# Patient Record
Sex: FEMALE | Race: WHITE | NOT HISPANIC OR LATINO | Employment: FULL TIME | URBAN - METROPOLITAN AREA
[De-identification: names, ages, dates, MRNs, and addresses within clinical notes are randomized per-mention and may not be internally consistent; named-entity substitution may affect disease eponyms.]

---

## 2017-01-09 ENCOUNTER — APPOINTMENT (OUTPATIENT)
Dept: MEDICAL GROUP | Age: 22
End: 2017-01-09
Payer: COMMERCIAL

## 2017-01-13 ENCOUNTER — NON-PROVIDER VISIT (OUTPATIENT)
Dept: MEDICAL GROUP | Age: 22
End: 2017-01-13
Payer: COMMERCIAL

## 2017-01-13 DIAGNOSIS — Z23 NEED FOR VACCINATION: ICD-10-CM

## 2017-01-13 PROCEDURE — 90471 IMMUNIZATION ADMIN: CPT | Performed by: PHYSICIAN ASSISTANT

## 2017-01-13 PROCEDURE — 90621 MENB-FHBP VACC 2/3 DOSE IM: CPT | Performed by: PHYSICIAN ASSISTANT

## 2017-01-13 NOTE — MR AVS SNAPSHOT
Danyelle Oliveros Ana Arevalo   2017 1:00 PM   Non-Provider Visit   MRN: [id number]    Department:  [address] [place] [address] [facility]   Dept Phone:  [phone]    Description:  Female : 1995   Provider:  OSIEL SOSA MA           Allergies as of 2017     Allergen Noted Reactions    Latex 10/03/2014       When apply will cause rash      You were diagnosed with     Need for vaccination   [[id number]]         Vital Signs     Smoking Status                   Never Smoker            Basic Information     Date Of Birth Sex Race Ethnicity Preferred Language    1995 Female White Non- English      Your appointments     May 31, 2017 10:00 AM   Non Provider 1 with [facility] SHEILA CASTRO   [facility])    [address]  [place] [address] [phone]           You will be receiving a confirmation call a few days before your appointment from our automated call confirmation system.              Problem List              ICD-10-CM Priority Class Noted - Resolved    Tension headache G44.209   2014 - Present    DUB (dysfunctional uterine bleeding) N93.8   2015 - Present    H/O iron deficiency secondary to DUB Z86.39   2015 - Present    Obesity (BMI 35.0-39.9 without comorbidity) ([facility]) E66.9   2016 - Present      Health Maintenance        Date Due Completion Dates    IMM HEP B VACCINE (1 of 3 - Primary Series) 1997 ---    IMM HEP A VACCINE (1 of 2 - Standard Series) 1998 ---    IMM HPV VACCINE (1 of 3 - Female 3 Dose Series) 2008 ---    IMM VARICELLA (CHICKENPOX) VACCINE (1 of 2 - 2 Dose Adolescent Series) 2010 ---    IMM MENINGOCOCCAL VACCINE (MCV4) (1 of 1) 2013 ---    IMM DTaP/Tdap/Td Vaccine (2 - Td) 2025            Current Immunizations     Influenza TIV (IM) 2016    Influenza Vaccine Quad Inj (Pf) 2014    Meningococcal Vaccine Serogroup B (Trumenba) 2017, 2016 11:57 AM    Tdap Vaccine 2015      Below and/or attached are the medications your provider expects you to take. Review all of your home medications and newly ordered medications with your provider and/or pharmacist. Follow medication instructions as directed by your provider and/or pharmacist. Please keep your medication list with you and share with your provider. Update the information when medications are discontinued, doses are changed, or new medications (including over-the-counter products) are added; and carry medication information at all times in the event of emergency situations     Allergies:  LATEX - (reactions not documented)               Medications  Valid as of: January 13, 2017 -  1:32 PM    Generic Name Brand Name Tablet Size Instructions for use    Butalbital-APAP-Caffeine (Tab) FIORICET -40 MG Take 1 Tab by mouth every 6 hours as needed. FOR HEADACHE.        Norethin Ace-Eth Estrad-FE (Tab) MICROGESTIN FE1.5/30 1.5-30 MG-MCG Take 1 Tab by mouth every day.        Ondansetron HCl (Tab) ZOFRAN 4 MG Take 1 Tab by mouth every four hours as needed for Nausea/Vomiting.        .                 Medicines prescribed today were sent to:     [facility] #[id number] - JEREMIAH ZURITA - [address]    [address] [place] [address]    Phone: [phone] Fax: [phone]    Open 24 Hours?: No      Medication refill instructions:       If your prescription bottle indicates you have medication refills left, it is not necessary to call your provider’s office. Please contact your pharmacy and they will refill your medication.    If your prescription bottle indicates you do not have any refills left, you may request refills at any time through one of the following ways: The online [employer] system (except Urgent Care), by calling your provider’s office, or by asking your pharmacy to contact your provider’s office with a refill request. Medication refills are processed only during regular business hours and may not be available until the next  business day. Your provider may request additional information or to have a follow-up visit with you prior to refilling your medication.   *Please Note: Medication refills are assigned a new Rx number when refilled electronically. Your pharmacy may indicate that no refills were authorized even though a new prescription for the same medication is available at the pharmacy. Please request the medicine by name with the pharmacy before contacting your provider for a refill.           VisualXcripthart Access Code: Activation code not generated  Current NICO Status: Active

## 2017-01-13 NOTE — PROGRESS NOTES
"Danyelle Arevalo is a 21 y.o. female here for a non-provider visit for:   TRUMENBA (Men B) 1 of 2    Reason for immunization: continue or complete series started at the office  Immunization records indicate need for vaccine: Yes  Minimum interval has been met for this vaccine: Yes  ABN completed: Not Indicated    VIS Dated  01/13/2017 was given to patient Yes  All IAC Questionnaire questions were answered “No.\"    Patient tolerated injection and no adverse effects were observed or reported patient tolerated.    Pt scheduled for next dose in series: Yes        "

## 2017-01-16 ENCOUNTER — TELEPHONE (OUTPATIENT)
Dept: MEDICAL GROUP | Age: 22
End: 2017-01-16

## 2017-01-16 ENCOUNTER — OFFICE VISIT (OUTPATIENT)
Dept: MEDICAL GROUP | Age: 22
End: 2017-01-16
Payer: COMMERCIAL

## 2017-01-16 VITALS
DIASTOLIC BLOOD PRESSURE: 72 MMHG | HEIGHT: 63 IN | HEART RATE: 114 BPM | SYSTOLIC BLOOD PRESSURE: 114 MMHG | OXYGEN SATURATION: 96 % | BODY MASS INDEX: 39.76 KG/M2 | TEMPERATURE: 98.1 F | WEIGHT: 224.4 LBS

## 2017-01-16 DIAGNOSIS — T78.40XA MINOR ALLERGIC REACTION, INITIAL ENCOUNTER: ICD-10-CM

## 2017-01-16 PROCEDURE — 99214 OFFICE O/P EST MOD 30 MIN: CPT | Performed by: FAMILY MEDICINE

## 2017-01-16 NOTE — PROGRESS NOTES
"Subjective:     Chief Complaint   Patient presents with   • Rash     L arm/poss reaction to trumemba     Danyelle Domo Arevalo is a 21 y.o. female here today for issues listed below    Pt had trumenba  first dose 11/28/16. No issues after  Had dose #2 on Friday 1/13/17. Next day developed slight redness and itching over upper arm at injection site. No difficulty breathing. No mouth/tongue swelling. No other rash.  No known reaction to any other vaccine in past.  No pain at site of vaccine.  Pt does have latex allergy. (per packaging info, Trumenba does not have latex)       Allergies: Latex  Current medicines (including changes today)  Current Outpatient Prescriptions   Medication Sig Dispense Refill   • ondansetron (ZOFRAN) 4 MG Tab tablet Take 1 Tab by mouth every four hours as needed for Nausea/Vomiting. 10 Tab 0   • acetaminophen/caffeine/butalbital 325-40-50 mg (FIORICET) -40 MG Tab Take 1 Tab by mouth every 6 hours as needed. FOR HEADACHE. 30 Tab 0   • norethindrone-ethinyl estradiol-iron (GILDESS FE 1.5/30) 1.5-30 MG-MCG tablet Take 1 Tab by mouth every day. 90 Tab 3     No current facility-administered medications for this visit.       She  has a past medical history of H/O iron deficiency secondary to DUB (2/1/2015); DUB (dysfunctional uterine bleeding) (2/1/2015); Tension headache (12/12/2014); and Major depression (12/12/2014).  Health Maintenance: pt requested to obtain prior immunization records.  ROS  Constitutional: Negative for fever, chills/sweats   Respiratory: Negative for shortness of breath, CURTIS  Cardiovascular: Negative for chest pain or pressure  GI/: Negative for diarrhea/constipation / urinary difficulty  All other systems reviewed and are negative except as per HPI.        Objective:     Blood pressure 114/72, pulse 114, temperature 36.7 °C (98.1 °F), height 1.594 m (5' 2.76\"), weight 101.787 kg (224 lb 6.4 oz), last menstrual period 01/01/2017, SpO2 96 %, not currently " breastfeeding. Body mass index is 40.06 kg/(m^2).  Physical Exam:  Alert, oriented in no acute distress.  Eye contact is good, speech goal directed, affect calm   Oral mucous membranes pink and moist with no lesions. No lip swelling  Neck No adenopathy or masses in the neck or supraclavicular regions.    Lungs: clear to auscultation bilaterally with good excursion.  CV: regular rate and rhythm.  Left upper arm with 10x12 cm wheel. No ulceration. No vesicles. No significant heat.      Assessment and Plan:       Danyelle was seen today for rash.    Diagnoses and all orders for this visit:    Minor allergic reaction, initial encounter  Comments:  advised antihistamine and cool compresses if needed. Take claritin prior to last Trumenba dose and remain in office for monitoring for 15 minutes afterward.        Followup: Return for MA visit for vaccine already scheduled. sooner should new symptoms or problems arise.

## 2017-01-16 NOTE — TELEPHONE ENCOUNTER
Have her come in for a same day appointment or go to   Dr. Shoemaker and Renee have a lot of availability.

## 2017-01-16 NOTE — TELEPHONE ENCOUNTER
Called and informed patient of Shiela's note below. Scheduled patient with Dr. Shoemaker at 2:30pm today.

## 2017-01-16 NOTE — TELEPHONE ENCOUNTER
1. Caller Name: Danyelle                                         Call Back Number: [phone] (home)       Patient approves a detailed voicemail message: yes    2. What are the patient's symptoms (location & severity)? Patient received Trumenba Vaccine on Friday, 1/13/17. Now has swollen arm (size of baseball), hot and sensitive to touch, red in color. Injection site feels hard (about quarter in size) and hurts to touch.    3. Is this a new symptom Yes    4. When did it start? Friday, 1/13/17    5. Action taken per Active Symptom Guide: Routed to PCP per PCP. Advised to go to UC if any worsening symptoms prior to hearing back from our office.    6. Patient agrees to recommended action per active symptom guide

## 2017-01-16 NOTE — PATIENT INSTRUCTIONS
For rash: use OTC claritin or zyrtec or allegra daily for 2-10 days.  If itching, use benadryl to help sleep  Or ice cube or cortisone cream on rash  When due for last Trumenba vaccine, take a claritin that morning.

## 2017-01-18 ENCOUNTER — PATIENT MESSAGE (OUTPATIENT)
Dept: MEDICAL GROUP | Age: 22
End: 2017-01-18

## 2017-01-18 DIAGNOSIS — E66.9 OBESITY (BMI 35.0-39.9 WITHOUT COMORBIDITY): ICD-10-CM

## 2017-01-19 NOTE — TELEPHONE ENCOUNTER
1. Caller Name: Pt                                         Call Back Number: Mychart      Patient approves a detailed voicemail message: no    2. SPECIFIC Action To Be Taken: Referral pending, please sign. - Please verify that I pended the correct referral.    3. Diagnosis/Clinical Reason for Request: Weight loss    4. Specialty & Provider Name/Lab/Imaging Location: the wellness and weight management clinic at [facility]    5. Is appointment scheduled for requested order/referral: no    Patient informed they will receive a return phone call from the office ONLY if there are any questions before processing their request. Advised to call back if they haven't received a call from the referral department in 5 days.

## 2017-03-29 DIAGNOSIS — N93.8 DUB (DYSFUNCTIONAL UTERINE BLEEDING): ICD-10-CM

## 2017-03-29 DIAGNOSIS — R11.0 NAUSEA: ICD-10-CM

## 2017-03-29 DIAGNOSIS — G44.209 TENSION HEADACHE: ICD-10-CM

## 2017-03-29 NOTE — TELEPHONE ENCOUNTER
Was the patient seen in the last year in this department? Yes     Does patient have an active prescription for medications requested?yes  RX need to go to [facility].  Pharmacy updated.    Received Request Via: Patient

## 2017-03-30 RX ORDER — NORETHINDRONE ACETATE AND ETHINYL ESTRADIOL 1.5-30(21)
1 KIT ORAL
Qty: 90 TAB | Refills: 3 | Status: SHIPPED | OUTPATIENT
Start: 2017-03-30 | End: 2018-04-26 | Stop reason: SDUPTHER

## 2017-03-30 RX ORDER — BUTALBITAL, ACETAMINOPHEN AND CAFFEINE 50; 325; 40 MG/1; MG/1; MG/1
1 TABLET ORAL EVERY 6 HOURS PRN
Qty: 30 TAB | Refills: 0 | Status: SHIPPED
Start: 2017-03-30 | End: 2018-05-21

## 2017-03-30 RX ORDER — ONDANSETRON 4 MG/1
4 TABLET, FILM COATED ORAL EVERY 4 HOURS PRN
Qty: 10 TAB | Refills: 0 | Status: SHIPPED | OUTPATIENT
Start: 2017-03-30 | End: 2023-02-06 | Stop reason: SDUPTHER

## 2017-03-30 NOTE — TELEPHONE ENCOUNTER
Prescription faxed to:    [facility] - [facility] [place] - [facility] [address] [address]  Phone: [phone] Fax: [phone]  .

## 2017-05-31 ENCOUNTER — APPOINTMENT (OUTPATIENT)
Dept: MEDICAL GROUP | Age: 22
End: 2017-05-31
Payer: COMMERCIAL

## 2017-10-13 ENCOUNTER — OFFICE VISIT (OUTPATIENT)
Dept: URGENT CARE | Facility: CLINIC | Age: 22
End: 2017-10-13
Payer: COMMERCIAL

## 2017-10-13 VITALS
WEIGHT: 219 LBS | DIASTOLIC BLOOD PRESSURE: 88 MMHG | TEMPERATURE: 98 F | OXYGEN SATURATION: 98 % | RESPIRATION RATE: 16 BRPM | HEART RATE: 82 BPM | SYSTOLIC BLOOD PRESSURE: 110 MMHG | BODY MASS INDEX: 38.8 KG/M2 | HEIGHT: 63 IN

## 2017-10-13 DIAGNOSIS — H66.001 ACUTE SUPPURATIVE OTITIS MEDIA OF RIGHT EAR WITHOUT SPONTANEOUS RUPTURE OF TYMPANIC MEMBRANE, RECURRENCE NOT SPECIFIED: ICD-10-CM

## 2017-10-13 DIAGNOSIS — J40 BRONCHITIS: Primary | ICD-10-CM

## 2017-10-13 DIAGNOSIS — J06.9 URI WITH COUGH AND CONGESTION: ICD-10-CM

## 2017-10-13 PROCEDURE — 99214 OFFICE O/P EST MOD 30 MIN: CPT | Performed by: PHYSICIAN ASSISTANT

## 2017-10-13 RX ORDER — AMOXICILLIN AND CLAVULANATE POTASSIUM 875; 125 MG/1; MG/1
1 TABLET, FILM COATED ORAL 2 TIMES DAILY
Qty: 20 TAB | Refills: 0 | Status: SHIPPED | OUTPATIENT
Start: 2017-10-13 | End: 2017-10-23

## 2017-10-13 RX ORDER — PROMETHAZINE HYDROCHLORIDE AND CODEINE PHOSPHATE 6.25; 1 MG/5ML; MG/5ML
5 SYRUP ORAL 4 TIMES DAILY PRN
Qty: 240 ML | Refills: 0 | Status: SHIPPED | OUTPATIENT
Start: 2017-10-13 | End: 2017-10-27

## 2017-10-13 NOTE — LETTER
October 13, 2017       Patient: Danyelle Arevalo   YOB: 1995   Date of Visit: 10/13/2017         To Whom It May Concern:    It is my medical opinion that Danyelle Arevalo may be excused from work for the dates of 10/13/17-10/14/17.      If you have any questions or concerns, please don't hesitate to call [phone]          Sincerely,          Kin Posada P.A.-C.  Electronically Signed

## 2017-10-13 NOTE — PATIENT INSTRUCTIONS
Acute Bronchitis  Bronchitis is inflammation of the airways that extend from the windpipe into the lungs (bronchi). The inflammation often causes mucus to develop. This leads to a cough, which is the most common symptom of bronchitis.   In acute bronchitis, the condition usually develops suddenly and goes away over time, usually in a couple weeks. Smoking, allergies, and asthma can make bronchitis worse. Repeated episodes of bronchitis may cause further lung problems.   CAUSES  Acute bronchitis is most often caused by the same virus that causes a cold. The virus can spread from person to person (contagious) through coughing, sneezing, and touching contaminated objects.  SIGNS AND SYMPTOMS   · Cough.    · Fever.    · Coughing up mucus.    · Body aches.    · Chest congestion.    · Chills.    · Shortness of breath.    · Sore throat.    DIAGNOSIS   Acute bronchitis is usually diagnosed through a physical exam. Your health care provider will also ask you questions about your medical history. Tests, such as chest X-rays, are sometimes done to rule out other conditions.   TREATMENT   Acute bronchitis usually goes away in a couple weeks. Oftentimes, no medical treatment is necessary. Medicines are sometimes given for relief of fever or cough. Antibiotic medicines are usually not needed but may be prescribed in certain situations. In some cases, an inhaler may be recommended to help reduce shortness of breath and control the cough. A cool mist vaporizer may also be used to help thin bronchial secretions and make it easier to clear the chest.   HOME CARE INSTRUCTIONS  · Get plenty of rest.    · Drink enough fluids to keep your urine clear or pale yellow (unless you have a medical condition that requires fluid restriction). Increasing fluids may help thin your respiratory secretions (sputum) and reduce chest congestion, and it will prevent dehydration.    · Take medicines only as directed by your health care provider.  · If  you were prescribed an antibiotic medicine, finish it all even if you start to feel better.  · Avoid smoking and secondhand smoke. Exposure to cigarette smoke or irritating chemicals will make bronchitis worse. If you are a smoker, consider using nicotine gum or skin patches to help control withdrawal symptoms. Quitting smoking will help your lungs heal faster.    · Reduce the chances of another bout of acute bronchitis by washing your hands frequently, avoiding people with cold symptoms, and trying not to touch your hands to your mouth, nose, or eyes.    · Keep all follow-up visits as directed by your health care provider.    SEEK MEDICAL CARE IF:  Your symptoms do not improve after 1 week of treatment.   SEEK IMMEDIATE MEDICAL CARE IF:  · You develop an increased fever or chills.    · You have chest pain.    · You have severe shortness of breath.  · You have bloody sputum.    · You develop dehydration.  · You faint or repeatedly feel like you are going to pass out.  · You develop repeated vomiting.  · You develop a severe headache.  MAKE SURE YOU:   · Understand these instructions.  · Will watch your condition.  · Will get help right away if you are not doing well or get worse.     This information is not intended to replace advice given to you by your health care provider. Make sure you discuss any questions you have with your health care provider.     Document Released: 01/25/2006 Document Revised: 01/08/2016 Document Reviewed: 06/10/2014  [employer] Interactive Patient Education ©2016 [employer]

## 2017-10-13 NOTE — PROGRESS NOTES
Subjective:      Pt is a 22 y.o. female who presents with URI (body aches, cough, right ear hurts, x3days)            HPI  PT presents to  clinic today complaining of sore throat, fevers, chills, watery eyes, pressure in ears, cough, fatigue, runny nose, wheezing and SOB. PT denies CP, NVD, abdominal pain, joint pain. PT states these symptoms began around 3 days ago and that the pt's family has been sick on and off for the last week. Pt has not taken any RX medications for this condition. PT states the pain is a 5/10 with coughing, aching in nature and worse at night.  The pt's medication list, problem list, and allergies have been evaluated and reviewed during today's visit.    PMH:  Past Medical History:   Diagnosis Date   • H/O iron deficiency secondary to DUB 2015   • DUB (dysfunctional uterine bleeding) 2015   • Tension headache 2014   • Major depression 2014       PSH:  Past Surgical History:   Procedure Laterality Date   • APPENDECTOMY         Fam Hx:    family history includes Cancer in her paternal grandmother; Cancer (age of onset: 47) in her mother; Cancer (age of onset: 62) in her maternal grandfather; Diabetes in her maternal grandfather; Hyperlipidemia in her father, maternal uncle, maternal uncle, paternal aunt, and paternal aunt; Hypertension in her maternal grandmother.  Family Status   Relation Status   • Maternal Grandmother Alive   • Maternal Grandfather Alive   • Father Alive   • Maternal Uncle Alive   • Paternal Aunt Alive   • Paternal Aunt Alive   • Maternal Uncle Alive   • Mother Alive   • Paternal Grandmother Alive   • Brother Alive   • Paternal Grandfather    • Maternal Uncle Alive       Soc HX:  Social History     Social History   • Marital status: Single     Spouse name: N/A   • Number of children: N/A   • Years of education: N/A     Occupational History   • Not on file.     Social History Main Topics   • Smoking status: Never Smoker   • Smokeless  "tobacco: Never Used   • Alcohol use 0.0 oz/week      Comment: rarely   • Drug use: No   • Sexual activity: No     Other Topics Concern   • Not on file     Social History Narrative   • No narrative on file         Medications:    Current Outpatient Prescriptions:   •  norethindrone-ethinyl estradiol-iron (GILDESS FE 1.5/30) 1.5-30 MG-MCG tablet, Take 1 Tab by mouth every day., Disp: 90 Tab, Rfl: 3  •  acetaminophen/caffeine/butalbital 325-40-50 mg (FIORICET) -40 MG Tab, Take 1 Tab by mouth every 6 hours as needed. FOR HEADACHE., Disp: 30 Tab, Rfl: 0  •  ondansetron (ZOFRAN) 4 MG Tab tablet, Take 1 Tab by mouth every four hours as needed for Nausea/Vomiting., Disp: 10 Tab, Rfl: 0      Allergies:  Latex    ROS    Review of Systems   Constitutional: Positive for malaise/fatigue. Negative for fever and diaphoresis.   HENT: Positive for congestion, ear pain and sore throat. Negative for ear discharge, hearing loss, nosebleeds and tinnitus.    Eyes: Negative for blurred vision, double vision and photophobia.   Respiratory: Positive for cough, sputum production, shortness of breath and wheezing. Negative for hemoptysis.    Cardiovascular: Negative for chest pain and palpitations.   Gastrointestinal: Negative for nausea, vomiting, abdominal pain, diarrhea and constipation.   Genitourinary: Negative for dysuria and flank pain.   Musculoskeletal: Negative for joint pain and myalgias.   Skin: Negative for itching and rash.   Neurological: Positive for headaches. Negative for dizziness, tingling and weakness.   Endo/Heme/Allergies: Does not bruise/bleed easily.   Psychiatric/Behavioral: Negative for depression. The patient is not nervous/anxious.           Objective:   Encounter Vitals  Standard Vitals  Vitals  Blood Pressure: 110/88  Temperature: 36.7 °C (98 °F)  Pulse: 82  Respiration: 16  Pulse Oximetry: 98 %  Height: 160 cm (5' 3\")  Weight: 99.3 kg (219 lb)  Encounter Vitals  Temperature: 36.7 °C (98 °F)  Temp Source: " "Temporal  Blood Pressure: 110/88  BP Location: Left, Upper Arm  Patient BP Position: Sitting  Pulse: 82  Respiration: 16  Pulse Oximetry: 98 %  Weight: 99.3 kg (219 lb)  Height: 160 cm (5' 3\")  BMI (Calculated): 38.79  Pulmonary-Specific Vitals     Durable Medical Equipment-Specific Vitals                 Physical Exam      Physical Exam   Constitutional: PT is oriented to person, place, and time. PT appears well-developed and well-nourished. No distress.   HENT:   Head: Normocephalic and atraumatic.   Right Ear: Hearing, external ear and ear canal normal. Tympanic membrane is erythematous and bulging. A middle ear effusion is present.   Left Ear: Hearing, tympanic membrane, external ear and ear canal normal.   Nose: Mucosal edema, rhinorrhea and sinus tenderness present. Right sinus exhibits frontal sinus tenderness. Left sinus exhibits frontal sinus tenderness.   Mouth/Throat: Uvula is midline. Mucous membranes are pale. Posterior oropharyngeal edema and posterior oropharyngeal erythema present. No oropharyngeal exudate.   Eyes: Conjunctivae normal and EOM are normal. Pupils are equal, round, and reactive to light. Right eye exhibits no discharge. Left eye exhibits no discharge.   Neck: Normal range of motion. Neck supple. No thyromegaly present.   Cardiovascular: Normal rate, regular rhythm, normal heart sounds and intact distal pulses.  Exam reveals no gallop and no friction rub.    No murmur heard.  Pulmonary/Chest: Effort normal. No respiratory distress. PT has wheezes. PT has no rales. PT exhibits tenderness.   Abdominal: Soft. Bowel sounds are normal. PT exhibits no distension and no mass. There is no tenderness. There is no rebound and no guarding.   Musculoskeletal: Normal range of motion. PT exhibits no edema and no tenderness.   Lymphadenopathy:     PT has no cervical adenopathy.   Neurological: Pt is alert and oriented to person, place, and time. Pt has normal reflexes. No cranial nerve deficit.   Skin: " Skin is warm and dry. No rash noted. No erythema.   Psychiatric: PT has a normal mood and affect. Pt behavior is normal. Judgment and thought content normal.          Assessment/Plan:     1. Bronchitis      2. Right otitis media    3. URI with cough and congestion    Augmentin  Codeine cough syrup  Rest, fluids encouraged.  OTC decongestant for congestion/cough  Note given for work.  AVS with medical info given.  Pt was in full understanding and agreement with the plan.  Follow-up as needed if symptoms worsen or fail to improve.

## 2018-02-03 ENCOUNTER — OFFICE VISIT (OUTPATIENT)
Dept: URGENT CARE | Facility: CLINIC | Age: 23
End: 2018-02-03
Payer: COMMERCIAL

## 2018-02-03 VITALS
HEART RATE: 84 BPM | WEIGHT: 225 LBS | BODY MASS INDEX: 39.87 KG/M2 | HEIGHT: 63 IN | DIASTOLIC BLOOD PRESSURE: 80 MMHG | TEMPERATURE: 97.9 F | RESPIRATION RATE: 20 BRPM | SYSTOLIC BLOOD PRESSURE: 120 MMHG | OXYGEN SATURATION: 96 %

## 2018-02-03 DIAGNOSIS — J01.90 ACUTE BACTERIAL SINUSITIS: ICD-10-CM

## 2018-02-03 DIAGNOSIS — B96.89 ACUTE BACTERIAL SINUSITIS: ICD-10-CM

## 2018-02-03 PROCEDURE — 99213 OFFICE O/P EST LOW 20 MIN: CPT | Performed by: NURSE PRACTITIONER

## 2018-02-03 RX ORDER — AMOXICILLIN AND CLAVULANATE POTASSIUM 875; 125 MG/1; MG/1
1 TABLET, FILM COATED ORAL 2 TIMES DAILY
Qty: 14 TAB | Refills: 0 | Status: SHIPPED | OUTPATIENT
Start: 2018-02-03 | End: 2018-02-10

## 2018-02-03 ASSESSMENT — PATIENT HEALTH QUESTIONNAIRE - PHQ9: CLINICAL INTERPRETATION OF PHQ2 SCORE: 0

## 2018-02-05 ASSESSMENT — ENCOUNTER SYMPTOMS
HEADACHES: 1
SORE THROAT: 1
COUGH: 1
FEVER: 0
HOARSE VOICE: 1
SINUS PRESSURE: 1
SINUS PAIN: 1

## 2018-02-05 NOTE — PROGRESS NOTES
"Subjective:      Danyelle Arevalo is a 22 y.o. female who presents with Sinus Problem (Couple wks stuffy nose, postnasal dripping, a lot mucus at morning .)            Sinus Problem   This is a new problem. Episode onset: Pt reports she has had sinus congestion, drip and drainage for several weeks. She was not sure if it was allergies at first. Now she is experiencing sinus headaches, ear pain and thicker drainage. The problem has been gradually worsening since onset. There has been no fever. The pain is moderate. Associated symptoms include congestion, coughing, headaches, a hoarse voice, sinus pressure and a sore throat. Past treatments include oral decongestants and spray decongestants. The treatment provided no relief.       Review of Systems   Constitutional: Positive for malaise/fatigue. Negative for fever.   HENT: Positive for congestion, hoarse voice, sinus pain, sinus pressure and sore throat.    Respiratory: Positive for cough.    Neurological: Positive for headaches.   All other systems reviewed and are negative.    Past Medical History:   Diagnosis Date   • DUB (dysfunctional uterine bleeding) 2/1/2015   • H/O iron deficiency secondary to DUB 2/1/2015   • Major depression 12/12/2014   • Tension headache 12/12/2014      Past Surgical History:   Procedure Laterality Date   • APPENDECTOMY  2007      Social History     Social History   • Marital status: Single     Spouse name: N/A   • Number of children: N/A   • Years of education: N/A     Occupational History   • Not on file.     Social History Main Topics   • Smoking status: Never Smoker   • Smokeless tobacco: Never Used   • Alcohol use 0.0 oz/week      Comment: rarely   • Drug use: No   • Sexual activity: No     Other Topics Concern   • Not on file     Social History Narrative   • No narrative on file          Objective:     /80   Pulse 84   Temp 36.6 °C (97.9 °F)   Resp 20   Ht 1.6 m (5' 3\")   Wt 102.1 kg (225 lb)   SpO2 96%   BMI 39.86 " kg/m²      Physical Exam   Constitutional: She is oriented to person, place, and time. Vital signs are normal. She appears well-developed and well-nourished.   HENT:   Head: Normocephalic and atraumatic.   Right Ear: Tympanic membrane and external ear normal.   Left Ear: Tympanic membrane and external ear normal.   Nose: Mucosal edema, rhinorrhea and sinus tenderness present. Left sinus exhibits maxillary sinus tenderness and frontal sinus tenderness.   Mouth/Throat: Posterior oropharyngeal erythema present.   Eyes: EOM are normal. Pupils are equal, round, and reactive to light.   Neck: Normal range of motion.   Cardiovascular: Normal rate and regular rhythm.    Pulmonary/Chest: Effort normal and breath sounds normal.   Musculoskeletal: Normal range of motion.   Lymphadenopathy:        Head (right side): Submandibular adenopathy present.        Head (left side): Submandibular adenopathy present.   Neurological: She is alert and oriented to person, place, and time.   Skin: Skin is warm and dry. Capillary refill takes less than 2 seconds.   Psychiatric: She has a normal mood and affect. Her speech is normal and behavior is normal. Thought content normal.   Vitals reviewed.              Assessment/Plan:     1. Acute bacterial sinusitis  - amoxicillin-clavulanate (AUGMENTIN) 875-125 MG Tab; Take 1 Tab by mouth 2 times a day for 7 days.  Dispense: 14 Tab; Refill: 0    Increase water intake  Continue OTC decongestant as directed  High volume nasal saline rinses  Daily non drowsy antihistamine  Supportive care, differential diagnoses, and indications for immediate follow-up discussed with patient.    Pathogenesis of diagnosis discussed including typical length and natural progression.      Instructed to return to  or nearest emergency department if symptoms fail to improve, for any change in condition, further concerns, or new concerning symptoms.  Patient states understanding of the plan of care and discharge  instructions.

## 2018-03-26 ENCOUNTER — OFFICE VISIT (OUTPATIENT)
Dept: URGENT CARE | Facility: CLINIC | Age: 23
End: 2018-03-26
Payer: COMMERCIAL

## 2018-03-26 VITALS
BODY MASS INDEX: 40.04 KG/M2 | HEIGHT: 63 IN | SYSTOLIC BLOOD PRESSURE: 110 MMHG | OXYGEN SATURATION: 99 % | TEMPERATURE: 98 F | RESPIRATION RATE: 18 BRPM | HEART RATE: 82 BPM | DIASTOLIC BLOOD PRESSURE: 70 MMHG | WEIGHT: 226 LBS

## 2018-03-26 DIAGNOSIS — J20.9 ACUTE BRONCHITIS, UNSPECIFIED ORGANISM: ICD-10-CM

## 2018-03-26 DIAGNOSIS — J01.80 OTHER ACUTE SINUSITIS, RECURRENCE NOT SPECIFIED: ICD-10-CM

## 2018-03-26 PROCEDURE — 99214 OFFICE O/P EST MOD 30 MIN: CPT | Performed by: FAMILY MEDICINE

## 2018-03-26 RX ORDER — METHYLPREDNISOLONE 4 MG/1
TABLET ORAL
Qty: 21 TAB | Refills: 0 | Status: SHIPPED | OUTPATIENT
Start: 2018-03-26 | End: 2018-05-21

## 2018-03-26 RX ORDER — AZITHROMYCIN 250 MG/1
TABLET, FILM COATED ORAL
Qty: 6 TAB | Refills: 0 | Status: SHIPPED | OUTPATIENT
Start: 2018-03-26 | End: 2018-05-21

## 2018-03-26 NOTE — PROGRESS NOTES
HPI: Danyelle Arevalo is a 23 y.o. female who presents with   Chief Complaint   Patient presents with   • Sinus Problem     [id number]    Patient has had a one-month sinus pressure and pain. She was seen in the urgent care and prescribed Augmentin twice for respiratory issues. She states that she had minimal improvement in symptoms with her last dosing has had some fatigue and malaise some mild chills no nausea vomiting or diarrhea some mild ear pressure and sore throat and occasional cough that is nonproductive. Denies history of sinus surgery.    Worsened by: activity, laying supine at night, first thing in the morning, when exposed to outside allergens  Improved by: OTC symptomatic medictions    ROS: Review of Systems performed. All other systems are negative except for what is listed above.     PMH:  has a past medical history of DUB (dysfunctional uterine bleeding) (2/1/2015); H/O iron deficiency secondary to DUB (2/1/2015); Major depression (12/12/2014); and Tension headache (12/12/2014).  MEDS:   Current Outpatient Prescriptions:   •  acetaminophen/caffeine/butalbital 325-40-50 mg (FIORICET) -40 MG Tab, Take 1 Tab by mouth every 6 hours as needed. FOR HEADACHE., Disp: 30 Tab, Rfl: 0  •  norethindrone-ethinyl estradiol-iron (GILDESS FE 1.5/30) 1.5-30 MG-MCG tablet, Take 1 Tab by mouth every day., Disp: 90 Tab, Rfl: 3  •  ondansetron (ZOFRAN) 4 MG Tab tablet, Take 1 Tab by mouth every four hours as needed for Nausea/Vomiting., Disp: 10 Tab, Rfl: 0  ALLERGIES:   Allergies   Allergen Reactions   • Latex      When apply will cause rash     SURGHX:   Past Surgical History:   Procedure Laterality Date   • APPENDECTOMY  2007     SOCHX:  reports that she has never smoked. She has never used smokeless tobacco. She reports that she drinks alcohol. She reports that she does not use drugs.  FH: Family history was reviewed, no pertinent findings to report    PE:  Vitals /70   Pulse 82   Temp 36.7 °C (98 °F)  "  Resp 18   Ht 1.6 m (5' 3\")   Wt 102.5 kg (226 lb)   SpO2 99%   BMI 40.03 kg/m²    Gen AOx4, NAD  HEENT: moist mucus membranes, no pain mild pressure with percussion of left maxillary sinuses.  Bilateral conjunciva clear without erythema or exudate, Bilateral TM's clear without bulge, fluid or loss of landmarks, no pharyngeal erythema or tonsillar exudate or tonsillar enlargement  Neck: supple, no cervical lymphadenopathy, no signs of menigismus  CV/PULM: RRR no murmurs, no rales ronchi or wheezes, no signs of resp distress  Abd soft nontender, bs present  Skin no rashes  Extremities -c/c/e  Neuro appropriate affect,       A/P  Consider ENT referral if symptoms persist  "

## 2018-04-26 DIAGNOSIS — N93.8 DUB (DYSFUNCTIONAL UTERINE BLEEDING): ICD-10-CM

## 2018-04-27 NOTE — TELEPHONE ENCOUNTER
Was the patient seen in the last year in this department? No     Does patient have an active prescription for medications requested? No     Received Request Via: Pharmacy     Please see pt's my chart message as well.

## 2018-04-28 RX ORDER — NORETHINDRONE ACETATE AND ETHINYL ESTRADIOL 1.5-30(21)
KIT ORAL
Qty: 84 TAB | Refills: 0 | Status: SHIPPED | OUTPATIENT
Start: 2018-04-28 | End: 2018-05-21 | Stop reason: SDUPTHER

## 2018-05-21 ENCOUNTER — HOSPITAL ENCOUNTER (OUTPATIENT)
Dept: LAB | Facility: MEDICAL CENTER | Age: 23
End: 2018-05-21
Attending: PHYSICIAN ASSISTANT
Payer: COMMERCIAL

## 2018-05-21 ENCOUNTER — OFFICE VISIT (OUTPATIENT)
Dept: MEDICAL GROUP | Age: 23
End: 2018-05-21
Payer: COMMERCIAL

## 2018-05-21 VITALS
TEMPERATURE: 97.5 F | OXYGEN SATURATION: 97 % | HEIGHT: 63 IN | BODY MASS INDEX: 40.47 KG/M2 | WEIGHT: 228.4 LBS | SYSTOLIC BLOOD PRESSURE: 108 MMHG | DIASTOLIC BLOOD PRESSURE: 72 MMHG | HEART RATE: 98 BPM

## 2018-05-21 DIAGNOSIS — Z23 NEED FOR VACCINATION: ICD-10-CM

## 2018-05-21 DIAGNOSIS — Z13.1 DIABETES MELLITUS SCREENING: ICD-10-CM

## 2018-05-21 DIAGNOSIS — Z13.29 ENCOUNTER FOR SCREENING FOR ENDOCRINE DISORDER: ICD-10-CM

## 2018-05-21 DIAGNOSIS — Z01.419 ENCOUNTER FOR GYNECOLOGICAL EXAMINATION: ICD-10-CM

## 2018-05-21 DIAGNOSIS — E66.01 MORBID OBESITY WITH BMI OF 40.0-44.9, ADULT (HCC): ICD-10-CM

## 2018-05-21 DIAGNOSIS — Z13.220 LIPID SCREENING: ICD-10-CM

## 2018-05-21 DIAGNOSIS — N93.8 DUB (DYSFUNCTIONAL UTERINE BLEEDING): ICD-10-CM

## 2018-05-21 LAB
ALBUMIN SERPL BCP-MCNC: 4 G/DL (ref 3.2–4.9)
ALBUMIN/GLOB SERPL: 1.3 G/DL
ALP SERPL-CCNC: 67 U/L (ref 30–99)
ALT SERPL-CCNC: 15 U/L (ref 2–50)
ANION GAP SERPL CALC-SCNC: 6 MMOL/L (ref 0–11.9)
AST SERPL-CCNC: 16 U/L (ref 12–45)
BASOPHILS # BLD AUTO: 0.6 % (ref 0–1.8)
BASOPHILS # BLD: 0.04 K/UL (ref 0–0.12)
BILIRUB SERPL-MCNC: 0.6 MG/DL (ref 0.1–1.5)
BUN SERPL-MCNC: 11 MG/DL (ref 8–22)
CALCIUM SERPL-MCNC: 9.4 MG/DL (ref 8.5–10.5)
CHLORIDE SERPL-SCNC: 104 MMOL/L (ref 96–112)
CHOLEST SERPL-MCNC: 193 MG/DL (ref 100–199)
CO2 SERPL-SCNC: 27 MMOL/L (ref 20–33)
CREAT SERPL-MCNC: 0.73 MG/DL (ref 0.5–1.4)
EOSINOPHIL # BLD AUTO: 0.1 K/UL (ref 0–0.51)
EOSINOPHIL NFR BLD: 1.4 % (ref 0–6.9)
ERYTHROCYTE [DISTWIDTH] IN BLOOD BY AUTOMATED COUNT: 43.6 FL (ref 35.9–50)
GLOBULIN SER CALC-MCNC: 3.2 G/DL (ref 1.9–3.5)
GLUCOSE SERPL-MCNC: 95 MG/DL (ref 65–99)
HCT VFR BLD AUTO: 45.8 % (ref 37–47)
HDLC SERPL-MCNC: 74 MG/DL
HGB BLD-MCNC: 14.8 G/DL (ref 12–16)
IMM GRANULOCYTES # BLD AUTO: 0.02 K/UL (ref 0–0.11)
IMM GRANULOCYTES NFR BLD AUTO: 0.3 % (ref 0–0.9)
LDLC SERPL CALC-MCNC: 98 MG/DL
LYMPHOCYTES # BLD AUTO: 2.04 K/UL (ref 1–4.8)
LYMPHOCYTES NFR BLD: 28.7 % (ref 22–41)
MCH RBC QN AUTO: 29 PG (ref 27–33)
MCHC RBC AUTO-ENTMCNC: 32.3 G/DL (ref 33.6–35)
MCV RBC AUTO: 89.6 FL (ref 81.4–97.8)
MONOCYTES # BLD AUTO: 0.4 K/UL (ref 0–0.85)
MONOCYTES NFR BLD AUTO: 5.6 % (ref 0–13.4)
NEUTROPHILS # BLD AUTO: 4.51 K/UL (ref 2–7.15)
NEUTROPHILS NFR BLD: 63.4 % (ref 44–72)
NRBC # BLD AUTO: 0 K/UL
NRBC BLD-RTO: 0 /100 WBC
PLATELET # BLD AUTO: 339 K/UL (ref 164–446)
PMV BLD AUTO: 10.9 FL (ref 9–12.9)
POTASSIUM SERPL-SCNC: 4.3 MMOL/L (ref 3.6–5.5)
PROT SERPL-MCNC: 7.2 G/DL (ref 6–8.2)
RBC # BLD AUTO: 5.11 M/UL (ref 4.2–5.4)
SODIUM SERPL-SCNC: 137 MMOL/L (ref 135–145)
TRIGL SERPL-MCNC: 103 MG/DL (ref 0–149)
TSH SERPL DL<=0.005 MIU/L-ACNC: 2.93 UIU/ML (ref 0.38–5.33)
WBC # BLD AUTO: 7.1 K/UL (ref 4.8–10.8)

## 2018-05-21 PROCEDURE — 90621 MENB-FHBP VACC 2/3 DOSE IM: CPT | Performed by: PHYSICIAN ASSISTANT

## 2018-05-21 PROCEDURE — 99395 PREV VISIT EST AGE 18-39: CPT | Mod: 25 | Performed by: PHYSICIAN ASSISTANT

## 2018-05-21 PROCEDURE — 90471 IMMUNIZATION ADMIN: CPT | Performed by: PHYSICIAN ASSISTANT

## 2018-05-21 PROCEDURE — 85025 COMPLETE CBC W/AUTO DIFF WBC: CPT

## 2018-05-21 PROCEDURE — 84443 ASSAY THYROID STIM HORMONE: CPT

## 2018-05-21 PROCEDURE — 80061 LIPID PANEL: CPT

## 2018-05-21 PROCEDURE — 80053 COMPREHEN METABOLIC PANEL: CPT

## 2018-05-21 PROCEDURE — 36415 COLL VENOUS BLD VENIPUNCTURE: CPT

## 2018-05-21 RX ORDER — NORETHINDRONE ACETATE AND ETHINYL ESTRADIOL 1.5-30(21)
1 KIT ORAL DAILY
Qty: 84 TAB | Refills: 4 | Status: SHIPPED | OUTPATIENT
Start: 2018-05-21 | End: 2018-08-09 | Stop reason: SDUPTHER

## 2018-05-21 NOTE — PROGRESS NOTES
Subjective:     CC:   Chief Complaint   Patient presents with   • Annual Exam       HPI:   Danyelle Arevalo is a 23 y.o. female who presents for annual exam    Pt has GYN provider: no  Last pap: never sexually active  Gardiasil:yes   Last Tdap: 2015    Menses every month with 3-5 days moderate bleeding.  Cramping is none to mild  She does not take OTC analgesics for cramps.  No significant bloating/fluid retention, pelvic pain, or abnormal vaginal discharge   No breast tenderness, mass, nipple discharge, changes in size or contour, or abnormal cyclic discomfort.  She does not perform regular self breast exams.  Regular exercise: no- gym membership at the beginning of the year. Went regularly for the first three months. Not anymore.   Diet: trying to avoid fast food entirely. Finds she eats a lot of sweets. Reports she is drinking ETOH now that she is 23--has been partying.     She  has a past medical history of DUB (dysfunctional uterine bleeding) (2/1/2015); H/O iron deficiency secondary to DUB (2/1/2015); Major depression (12/12/2014); and Tension headache (12/12/2014).  She  has a past surgical history that includes appendectomy (2007).    Family History   Problem Relation Age of Onset   • Hypertension Maternal Grandmother    • Cancer Maternal Grandfather 62     colon   • Diabetes Maternal Grandfather    • Hyperlipidemia Father    • Hyperlipidemia Maternal Uncle    • Hyperlipidemia Paternal Aunt    • Hyperlipidemia Paternal Aunt    • Hyperlipidemia Maternal Uncle    • Cancer Mother 47     Breast   • Cancer Paternal Grandmother      breast       Social History     Social History   • Marital status: Single     Spouse name: N/A   • Number of children: N/A   • Years of education: N/A     Occupational History   • Not on file.     Social History Main Topics   • Smoking status: Never Smoker   • Smokeless tobacco: Never Used   • Alcohol use 4.2 oz/week     7 Shots of liquor per week   • Drug use: No   • Sexual  "activity: No      Comment: never     Other Topics Concern   • Not on file     Social History Narrative   • No narrative on file       Patient Active Problem List    Diagnosis Date Noted   • Morbid obesity with BMI of 40.0-44.9, adult ([facility]) 11/28/2016   • DUB (dysfunctional uterine bleeding) 02/01/2015   • H/O iron deficiency secondary to DUB 02/01/2015   • Tension headache 12/12/2014         Current Outpatient Prescriptions   Medication Sig Dispense Refill   • BLISOVI FE 1.5/30 1.5-30 MG-MCG tablet TAKE 1 TABLET DAILY 84 Tab 0   • ondansetron (ZOFRAN) 4 MG Tab tablet Take 1 Tab by mouth every four hours as needed for Nausea/Vomiting. 10 Tab 0     No current facility-administered medications for this visit.      Allergies   Allergen Reactions   • Latex      When apply will cause rash       Review of Systems   Constitutional: Negative for fever, chills. Reports more fatigue lately. She is working 40+ hours a week at [employer].   HENT: Negative for congestion.  Right ear was bothering her yesterday--OK today.   Eyes: Negative for pain.   Respiratory: Negative for cough and shortness of breath.    Cardiovascular: Negative for leg swelling.   Gastrointestinal: Negative for nausea, vomiting, abdominal pain and diarrhea.   Genitourinary: Negative for dysuria and hematuria.   Skin: Negative for rash.   Neurological: Negative for dizziness, focal weakness and headaches.   Endo/Heme/Allergies: Does not bruise/bleed easily.   Psychiatric/Behavioral: Negative for depression.  The patient is not nervous/anxious.      Objective:     Vitals:    05/21/18 0802   BP: 108/72   Pulse: 98   Temp: 36.4 °C (97.5 °F)   SpO2: 97%   Weight: 103.6 kg (228 lb 6.4 oz)   Height: 1.6 m (5' 3\")     Body mass index is 40.46 kg/m².   Wt Readings from Last 4 Encounters:   05/21/18 103.6 kg (228 lb 6.4 oz)   03/26/18 102.5 kg (226 lb)   02/03/18 102.1 kg (225 lb)   10/13/17 99.3 kg (219 lb)       Physical Exam:  Constitutional: Well-developed and " well-nourished. Not diaphoretic. No distress. Morbidly obese female.   Skin: Skin is warm and dry. No rash noted.  Head: Atraumatic without lesions.  Eyes: Conjunctivae and extraocular motions are normal. Pupils are equal, round, and reactive to light. No scleral icterus.   Ears:  External ears unremarkable. Tympanic membranes clear and intact.  Nose: Nares patent. Septum midline. Turbinates without erythema nor edema. No discharge.   Mouth/Throat: Tongue normal. Oropharynx is clear and moist. Posterior pharynx without erythema or exudates.  Neck: Supple, trachea midline. Normal range of motion. No thyromegaly present.  No lymphadenopathy--cervical or supraclavicular  Cardiovascular: Regular rate and rhythm, S1 and S2 without murmur, rubs, or gallops.    Chest: Effort normal. Clear to auscultation throughout. No adventitious sounds. No CVA tenderness.  Breast: No skin changes, peau d'orange or nipple retraction. No nipple discharge. No axillary or supraclavicular adenopathy. No masses or nodularity palpable.  Abdomen: Soft, non tender, and without distention. Active bowel sounds in all four quadrants. No rebound, guarding, masses or HSM.  Extremities: No erythema or edema.  Neurological: Alert and oriented x 3.   Psychiatric:  Behavior, mood, and affect are appropriate.    Assessment and Plan:     1. Encounter for gynecological examination -Discussed  breast self exam, mammography screening, STD prevention, HIV risk factors and prevention, use and side effects of OCP's, family planning choices, diet and exercise     2. Morbid obesity with BMI of 40.0-44.9, adult ([facility]CC) -Patient's body mass index is 40.46 kg/m². Exercise and nutrition counseling were performed at this visit. Patient identified as having weight management issue.  Appropriate orders and counseling given.   3. Need for vaccination -VIS given. Informed consent obtained. Vaccine administered in office.  She will work on getting us her immunization record  so we may update her chart. [place] has been unresponsive. Meningococcal (IM) Group B   4. Lipid screening -labs ordered LIPID PROFILE    CBC WITH DIFFERENTIAL   5. Diabetes mellitus screening - labs ordered COMP METABOLIC PANEL   6. Encounter for screening for endocrine disorder- labs ordered  TSH WITH REFLEX TO FT4   7. DUB (dysfunctional uterine bleeding) - refilled OCP x 1 year.  norethindrone-ethinyl estradiol-iron (BLISOVI FE 1.5/30) 1.5-30 MG-MCG tablet       HCM:  Postponed pap and chlamydia screening x 1  Year. She is not sexually active, nor has she ever been.   Labs per orders  Immunizations per orders  Pt counseled about skin care, diet, supplements, and exercise.      Follow-up: Return in about 1 year (around 5/21/2019) for GYN exam. sooner if needed or warranted by labs.     This dictation was created using voice recognition software. The accuracy of the dictation is limited to the abilities of the software. I expect there may be some errors of grammar and possibly content. The MA notes were reviewed and certain aspects of this information were incorporated into this note.

## 2018-08-09 DIAGNOSIS — N93.8 DUB (DYSFUNCTIONAL UTERINE BLEEDING): ICD-10-CM

## 2018-08-10 RX ORDER — NORETHINDRONE ACETATE AND ETHINYL ESTRADIOL 1.5-30(21)
1 KIT ORAL DAILY
Qty: 84 TAB | Refills: 3 | Status: SHIPPED | OUTPATIENT
Start: 2018-08-10 | End: 2018-09-09

## 2018-09-06 DIAGNOSIS — N93.8 DUB (DYSFUNCTIONAL UTERINE BLEEDING): ICD-10-CM

## 2018-09-09 RX ORDER — NORETHINDRONE ACETATE AND ETHINYL ESTRADIOL 1.5-30(21)
KIT ORAL
Qty: 84 TAB | Refills: 2 | Status: SHIPPED | OUTPATIENT
Start: 2018-09-09 | End: 2018-09-13 | Stop reason: SDUPTHER

## 2018-09-12 ENCOUNTER — PATIENT MESSAGE (OUTPATIENT)
Dept: MEDICAL GROUP | Age: 23
End: 2018-09-12

## 2018-09-12 DIAGNOSIS — N93.8 DUB (DYSFUNCTIONAL UTERINE BLEEDING): ICD-10-CM

## 2018-09-13 RX ORDER — NORETHINDRONE ACETATE AND ETHINYL ESTRADIOL 1.5-30(21)
1 KIT ORAL DAILY
Qty: 84 TAB | Refills: 2 | Status: SHIPPED | OUTPATIENT
Start: 2018-09-13 | End: 2019-05-04

## 2018-11-07 ENCOUNTER — OFFICE VISIT (OUTPATIENT)
Dept: URGENT CARE | Facility: CLINIC | Age: 23
End: 2018-11-07
Payer: COMMERCIAL

## 2018-11-07 VITALS
SYSTOLIC BLOOD PRESSURE: 114 MMHG | WEIGHT: 229 LBS | HEIGHT: 63 IN | HEART RATE: 78 BPM | BODY MASS INDEX: 40.57 KG/M2 | OXYGEN SATURATION: 99 % | TEMPERATURE: 97.7 F | DIASTOLIC BLOOD PRESSURE: 80 MMHG | RESPIRATION RATE: 16 BRPM

## 2018-11-07 DIAGNOSIS — H10.32 ACUTE BACTERIAL CONJUNCTIVITIS OF LEFT EYE: ICD-10-CM

## 2018-11-07 PROCEDURE — 99214 OFFICE O/P EST MOD 30 MIN: CPT | Performed by: PHYSICIAN ASSISTANT

## 2018-11-07 RX ORDER — MOXIFLOXACIN 5 MG/ML
1 SOLUTION/ DROPS OPHTHALMIC 3 TIMES DAILY
Qty: 1 BOTTLE | Refills: 0 | Status: SHIPPED | OUTPATIENT
Start: 2018-11-07 | End: 2018-11-14

## 2018-11-07 ASSESSMENT — ENCOUNTER SYMPTOMS
FEVER: 0
EYE PAIN: 1
COUGH: 0
SHORTNESS OF BREATH: 0
PALPITATIONS: 0
SINUS PAIN: 0
PHOTOPHOBIA: 0
SORE THROAT: 0
BLURRED VISION: 0
DOUBLE VISION: 0
HEADACHES: 0
EYE REDNESS: 1
EYE DISCHARGE: 1
CHILLS: 0

## 2018-11-07 ASSESSMENT — PAIN SCALES - GENERAL: PAINLEVEL: 2=MINIMAL-SLIGHT

## 2018-11-07 NOTE — PROGRESS NOTES
Subjective:      Danyelle Arevalo is a 23 y.o. female who presents with Eye Problem (left eye redness)            Eye Problem    The left eye is affected. This is a new problem. The current episode started yesterday. The problem occurs constantly. The problem has been unchanged. The pain is moderate. She wears contacts. Associated symptoms include an eye discharge and eye redness. Pertinent negatives include no blurred vision, double vision, fever or photophobia. She has tried nothing for the symptoms.       Review of Systems   Constitutional: Negative for chills, fever and malaise/fatigue.   HENT: Negative for congestion, ear pain, sinus pain and sore throat.    Eyes: Positive for pain, discharge and redness. Negative for blurred vision, double vision and photophobia.   Respiratory: Negative for cough and shortness of breath.    Cardiovascular: Negative for chest pain and palpitations.   Skin: Negative for rash.   Neurological: Negative for headaches.   All other systems reviewed and are negative.    PMH:  has a past medical history of DUB (dysfunctional uterine bleeding) (2/1/2015); H/O iron deficiency secondary to DUB (2/1/2015); Major depression (12/12/2014); and Tension headache (12/12/2014).  MEDS:   Current Outpatient Prescriptions:   •  moxifloxacin (VIGAMOX) 0.5 % Solution, Place 1 Drop in both eyes 3 times a day for 7 days., Disp: 1 Bottle, Rfl: 0  •  norethindrone-ethinyl estradiol-iron (BLISOVI FE 1.5/30) 1.5-30 MG-MCG tablet, Take 1 Tab by mouth every day., Disp: 84 Tab, Rfl: 2  •  ondansetron (ZOFRAN) 4 MG Tab tablet, Take 1 Tab by mouth every four hours as needed for Nausea/Vomiting., Disp: 10 Tab, Rfl: 0  ALLERGIES:   Allergies   Allergen Reactions   • Latex      When apply will cause rash     SURGHX:   Past Surgical History:   Procedure Laterality Date   • APPENDECTOMY  2007     SOCHX:  reports that she has never smoked. She has never used smokeless tobacco. She reports that she drinks about  "4.2 oz of alcohol per week . She reports that she does not use drugs.  FH: Family history was reviewed, no pertinent findings to report  Medications, Allergies, and current problem list reviewed today in Epic       Objective:     /80 (BP Location: Right arm, Patient Position: Sitting, BP Cuff Size: Adult)   Pulse 78   Temp 36.5 °C (97.7 °F) (Temporal)   Resp 16   Ht 1.6 m (5' 3\")   Wt 103.9 kg (229 lb)   SpO2 99%   BMI 40.57 kg/m²      Physical Exam   Constitutional: She is oriented to person, place, and time. She appears well-developed and well-nourished. She is active.  Non-toxic appearance. She does not have a sickly appearance. She does not appear ill. No distress.   HENT:   Head: Normocephalic and atraumatic.   Right Ear: Hearing, tympanic membrane, external ear and ear canal normal.   Left Ear: Hearing, tympanic membrane, external ear and ear canal normal.   Nose: Nose normal.   Mouth/Throat: Uvula is midline, oropharynx is clear and moist and mucous membranes are normal. No oropharyngeal exudate.   Eyes: Pupils are equal, round, and reactive to light. EOM and lids are normal. Lids are everted and swept, no foreign bodies found. Left eye exhibits exudate. Left conjunctiva is injected.   Neck: Normal range of motion and full passive range of motion without pain. Neck supple.   Cardiovascular: Regular rhythm and normal heart sounds.  Exam reveals no gallop and no friction rub.    No murmur heard.  Pulmonary/Chest: Effort normal and breath sounds normal. No respiratory distress. She has no decreased breath sounds. She has no wheezes. She has no rales. She exhibits no tenderness.   Musculoskeletal: Normal range of motion. She exhibits no edema, tenderness or deformity.   Neurological: She is alert and oriented to person, place, and time.   Skin: Skin is warm, dry and intact. No rash noted.   Psychiatric: She has a normal mood and affect. Her speech is normal and behavior is normal. Judgment and " thought content normal.   Vitals reviewed.              Assessment/Plan:     1. Acute bacterial conjunctivitis of left eye    - moxifloxacin (VIGAMOX) 0.5 % Solution; Place 1 Drop in both eyes 3 times a day for 7 days.  Dispense: 1 Bottle; Refill: 0    Differential diagnosis, natural history, supportive care discussed. Follow-up with primary care provider within 7-10 days, emergency room precautions discussed.  Patient and/or family appears understanding of information.  Handout and review of patients diagnosis and treatment was discussed extensively.

## 2019-04-22 ENCOUNTER — PATIENT MESSAGE (OUTPATIENT)
Dept: MEDICAL GROUP | Age: 24
End: 2019-04-22

## 2019-04-22 DIAGNOSIS — E66.01 MORBID OBESITY WITH BMI OF 40.0-44.9, ADULT (HCC): ICD-10-CM

## 2019-04-22 NOTE — PATIENT COMMUNICATION
1. Caller Name: Danyelle Arevalo                                           Call Back Number: MyChart      Patient approves a detailed voicemail message: yes    2. SPECIFIC Action To Be Taken: Referral pending, please sign.    3. Diagnosis/Clinical Reason for Request: Obesity     4. Specialty & Provider Name/Lab/Imaging Location: [facility]     5. Is appointment scheduled for requested order/referral: no    Patient was informed they will receive a return phone call from the office ONLY if there are any questions before processing their request. Advised to call back if they haven't received a call from the referral department in 5 days.

## 2019-05-02 DIAGNOSIS — N93.8 DUB (DYSFUNCTIONAL UTERINE BLEEDING): ICD-10-CM

## 2019-05-04 RX ORDER — NORETHINDRONE ACETATE AND ETHINYL ESTRADIOL 1.5-30(21)
KIT ORAL
Qty: 84 TAB | Refills: 0 | Status: SHIPPED | OUTPATIENT
Start: 2019-05-04 | End: 2019-05-07 | Stop reason: SDUPTHER

## 2019-05-07 ENCOUNTER — OFFICE VISIT (OUTPATIENT)
Dept: MEDICAL GROUP | Age: 24
End: 2019-05-07
Payer: COMMERCIAL

## 2019-05-07 VITALS
HEIGHT: 63 IN | BODY MASS INDEX: 40.5 KG/M2 | WEIGHT: 228.6 LBS | DIASTOLIC BLOOD PRESSURE: 78 MMHG | TEMPERATURE: 97 F | SYSTOLIC BLOOD PRESSURE: 114 MMHG | HEART RATE: 99 BPM | OXYGEN SATURATION: 97 %

## 2019-05-07 DIAGNOSIS — G44.209 TENSION HEADACHE: ICD-10-CM

## 2019-05-07 DIAGNOSIS — Z00.00 WELL ADULT EXAM: ICD-10-CM

## 2019-05-07 DIAGNOSIS — N93.8 DUB (DYSFUNCTIONAL UTERINE BLEEDING): ICD-10-CM

## 2019-05-07 DIAGNOSIS — L30.9 DERMATITIS: ICD-10-CM

## 2019-05-07 PROCEDURE — 99395 PREV VISIT EST AGE 18-39: CPT | Performed by: PHYSICIAN ASSISTANT

## 2019-05-07 RX ORDER — AMOXICILLIN 500 MG/1
CAPSULE ORAL
COMMUNITY
Start: 2019-05-06 | End: 2019-12-10

## 2019-05-07 RX ORDER — NORETHINDRONE ACETATE AND ETHINYL ESTRADIOL 1.5-30(21)
1 KIT ORAL DAILY
Qty: 84 TAB | Refills: 11 | Status: SHIPPED | OUTPATIENT
Start: 2019-05-07 | End: 2019-08-05 | Stop reason: SDUPTHER

## 2019-05-07 RX ORDER — TRIAMCINOLONE ACETONIDE 1 MG/G
1 CREAM TOPICAL 2 TIMES DAILY
Qty: 1 TUBE | Refills: 3 | Status: SHIPPED | OUTPATIENT
Start: 2019-05-07 | End: 2019-12-10

## 2019-05-07 ASSESSMENT — PATIENT HEALTH QUESTIONNAIRE - PHQ9: CLINICAL INTERPRETATION OF PHQ2 SCORE: 0

## 2019-05-07 NOTE — PROGRESS NOTES
Subjective:     CC:   Chief Complaint   Patient presents with   • Annual Exam     Headache follow up        HPI:   Danyelle Arevalo is a 24 y.o. female who presents for annual exam    Pt has GYN provider: no  Last pap: never has been sexually active. Not interested in pap.  Gardiasil:yes at [place]. Unable to find records  Last Tdap: 2015    Twisted ankle three months ago. Still having some pain.     Rash under armpits x 1 month. Using jock itch cream x 1.5 weeks once daily. Helped a lot but still there.     Menses every month with 3-4 days moderate bleeding.  Cramping is mild.   She do not take OTC analgesics for cramps.  No significant bloating/fluid retention, pelvic pain.No vaginal discharge   No breast tenderness, mass, nipple discharge, changes in size or contour, or abnormal cyclic discomfort.  Regular exercise: none  Diet: poor; no self control. Tried intermittent fasting without relief. Interested in referral to weight management, however, with new job will have new insurance so would like to wait.     She  has a past medical history of DUB (dysfunctional uterine bleeding) (2/1/2015); H/O iron deficiency secondary to DUB (2/1/2015); Major depression (12/12/2014); and Tension headache (12/12/2014).  She  has a past surgical history that includes appendectomy (2007).    Family History   Problem Relation Age of Onset   • Hypertension Maternal Grandmother    • Cancer Maternal Grandfather 62        colon   • Diabetes Maternal Grandfather    • Hyperlipidemia Father    • Hyperlipidemia Maternal Uncle    • Hyperlipidemia Paternal Aunt    • Hyperlipidemia Paternal Aunt    • Hyperlipidemia Maternal Uncle    • Cancer Mother 47        Breast   • Cancer Paternal Grandmother         breast       Social History     Social History   • Marital status: Single     Spouse name: N/A   • Number of children: N/A   • Years of education: N/A     Occupational History   • Not on file.     Social History Main Topics   • Smoking  status: Never Smoker   • Smokeless tobacco: Never Used   • Alcohol use 4.2 oz/week     7 Shots of liquor per week   • Drug use: No   • Sexual activity: No      Comment: never     Other Topics Concern   • Not on file     Social History Narrative   • No narrative on file       Patient Active Problem List    Diagnosis Date Noted   • Morbid obesity with BMI of 40.0-44.9, adult ([facility]) 11/28/2016   • DUB (dysfunctional uterine bleeding) 02/01/2015   • H/O iron deficiency secondary to DUB 02/01/2015   • Tension headache 12/12/2014         Current Outpatient Prescriptions   Medication Sig Dispense Refill   • amoxicillin (AMOXIL) 500 MG Cap      • triamcinolone acetonide (KENALOG) 0.1 % Cream Apply 1 Application to affected area(s) 2 times a day. For less than 10 days 1 Tube 3   • norethindrone-ethinyl estradiol-iron (BLISOVI FE 1.5/30) 1.5-30 MG-MCG tablet Take 1 Tab by mouth every day. 84 Tab 11   • ondansetron (ZOFRAN) 4 MG Tab tablet Take 1 Tab by mouth every four hours as needed for Nausea/Vomiting. 10 Tab 0     No current facility-administered medications for this visit.      Allergies   Allergen Reactions   • Latex      When apply will cause rash       Review of Systems   Constitutional: Negative for fever, chills and malaise/fatigue.   HENT: Negative for congestion.    Eyes: Negative for pain.   Respiratory: Negative for cough and shortness of breath.    Cardiovascular: Negative for leg swelling.   Gastrointestinal: Negative for nausea, vomiting, abdominal pain and diarrhea.   Genitourinary: Negative for dysuria and hematuria.   Skin: Negative for rash.   Neurological: Negative for dizziness, focal weakness. + headaches (less frequent, 2x per year)  Endo/Heme/Allergies: Does not bruise/bleed easily.   Psychiatric/Behavioral: Negative for depression.  The patient is not nervous/anxious.  No insomnia    Objective:     Vitals:    05/07/19 0904   BP: 114/78   BP Location: Left arm   Patient Position: Sitting   BP Cuff  "Size: Adult   Pulse: 99   Temp: 36.1 °C (97 °F)   TempSrc: Temporal   SpO2: 97%   Weight: 103.7 kg (228 lb 9.6 oz)   Height: 1.6 m (5' 3\")     Body mass index is 40.49 kg/m².   Wt Readings from Last 4 Encounters:   05/07/19 103.7 kg (228 lb 9.6 oz)   11/07/18 103.9 kg (229 lb)   05/21/18 103.6 kg (228 lb 6.4 oz)   03/26/18 102.5 kg (226 lb)       Physical Exam:  Constitutional: Well-developed and well-nourished. Not diaphoretic. No distress. Morbildy obese female.  Skin: Skin is warm and dry. Small erythematous bumps on axilla-- outer borders. Acanthosis nigricans also noted.  Head: Atraumatic without lesions.  Eyes: Conjunctivae and extraocular motions are normal. Pupils are equal, round, and reactive to light. No scleral icterus.   Ears:  External ears unremarkable. Tympanic membranes clear and intact.  Nose: Nares patent. Septum midline. Turbinates without erythema nor edema. No discharge.   Mouth/Throat: Tongue normal. Oropharynx is clear and moist. Posterior pharynx without erythema or exudates.  Neck: Supple, trachea midline. Normal range of motion. No thyromegaly present. No lymphadenopathy--cervical or supraclavicular  Cardiovascular: Regular rate and rhythm, S1 and S2 without murmur, rubs, or gallops.    Chest: Effort normal. Clear to auscultation throughout. No adventitious sounds. No CVA tenderness.  Abdomen: Soft, non tender, and without distention. Active bowel sounds in all four quadrants. No rebound, guarding, masses or HSM.  Neurological: Alert and oriented x 3.   Psychiatric:  Behavior, mood, and affect are appropriate.    Assessment and Plan:     1. Well adult exam -Patient's body mass index is 40.49 kg/m². Exercise and nutrition counseling were performed at this visit.  She will work on getting into [facility] program once she has new insurance    2. Tension headache - stable. Very infrequent. Controlled with OTC meds.    3. DUB (dysfunctional uterine bleeding) -stable. Controlled with OCP. " norethindrone-ethinyl estradiol-iron (BLISOVI FE 1.5/30) 1.5-30 MG-MCG tablet   4. Dermatitis - Kenalog cream sent to pharmacy. If no improvement will send me message in 1 week.  triamcinolone acetonide (KENALOG) 0.1 % Cream       Pt counseled about skin care, diet, supplements, and exercise.  Discussed  STD prevention, feminine hygiene, use and side effects of OCP's, family planning choices, diet and exercise     Follow-up: Return in about 1 year (around 5/7/2020) for GYN exam, sooner if needed.

## 2019-05-10 ENCOUNTER — HOSPITAL ENCOUNTER (OUTPATIENT)
Facility: MEDICAL CENTER | Age: 24
End: 2019-05-10
Attending: FAMILY MEDICINE
Payer: COMMERCIAL

## 2019-05-10 ENCOUNTER — EMPLOYEE HEALTH (OUTPATIENT)
Dept: OCCUPATIONAL MEDICINE | Facility: CLINIC | Age: 24
End: 2019-05-10

## 2019-05-10 ENCOUNTER — EH NON-PROVIDER (OUTPATIENT)
Dept: OCCUPATIONAL MEDICINE | Facility: CLINIC | Age: 24
End: 2019-05-10

## 2019-05-10 VITALS
BODY MASS INDEX: 39.87 KG/M2 | SYSTOLIC BLOOD PRESSURE: 124 MMHG | OXYGEN SATURATION: 98 % | HEIGHT: 63 IN | DIASTOLIC BLOOD PRESSURE: 78 MMHG | TEMPERATURE: 97.7 F | HEART RATE: 119 BPM | WEIGHT: 225 LBS

## 2019-05-10 DIAGNOSIS — Z02.1 PRE-EMPLOYMENT HEALTH SCREENING EXAMINATION: ICD-10-CM

## 2019-05-10 DIAGNOSIS — Z02.1 PRE-EMPLOYMENT DRUG SCREENING: ICD-10-CM

## 2019-05-10 DIAGNOSIS — Z02.1 PHYSICAL EXAM, PRE-EMPLOYMENT: ICD-10-CM

## 2019-05-10 LAB
AMP AMPHETAMINE: NORMAL
BAR BARBITURATES: NORMAL
BZO BENZODIAZEPINES: NORMAL
COC COCAINE: NORMAL
INT CON NEG: NORMAL
INT CON POS: NORMAL
MDMA ECSTASY: NORMAL
MET METHAMPHETAMINES: NORMAL
MTD METHADONE: NORMAL
OPI OPIATES: NORMAL
OXY OXYCODONE: NORMAL
PCP PHENCYCLIDINE: NORMAL
POC URINE DRUG SCREEN OCDRS: NEGATIVE
RUBV AB SER QL: 135 IU/ML
THC: NORMAL

## 2019-05-10 PROCEDURE — 86735 MUMPS ANTIBODY: CPT | Performed by: PREVENTIVE MEDICINE

## 2019-05-10 PROCEDURE — 86787 VARICELLA-ZOSTER ANTIBODY: CPT | Performed by: PREVENTIVE MEDICINE

## 2019-05-10 PROCEDURE — 86762 RUBELLA ANTIBODY: CPT | Performed by: PREVENTIVE MEDICINE

## 2019-05-10 PROCEDURE — 8915 PR COMPREHENSIVE PHYSICAL: Performed by: PREVENTIVE MEDICINE

## 2019-05-10 PROCEDURE — 86765 RUBEOLA ANTIBODY: CPT | Performed by: FAMILY MEDICINE

## 2019-05-10 PROCEDURE — 80305 DRUG TEST PRSMV DIR OPT OBS: CPT | Performed by: FAMILY MEDICINE

## 2019-05-10 PROCEDURE — 86480 TB TEST CELL IMMUN MEASURE: CPT | Performed by: PREVENTIVE MEDICINE

## 2019-05-11 LAB
MEV IGG SER IA-ACNC: 1.13
MUV IGG SER IA-ACNC: 0.56
VZV IGG SER IA-ACNC: 2.02

## 2019-05-13 LAB
GAMMA INTERFERON BACKGROUND BLD IA-ACNC: 0.02 IU/ML
M TB IFN-G BLD-IMP: NEGATIVE
M TB IFN-G CD4+ BCKGRND COR BLD-ACNC: 0 IU/ML
MITOGEN IGNF BCKGRD COR BLD-ACNC: >10 IU/ML
QFT TB2 - NIL TBQ2: 0 IU/ML

## 2019-05-15 ENCOUNTER — EH NON-PROVIDER (OUTPATIENT)
Dept: OCCUPATIONAL MEDICINE | Facility: CLINIC | Age: 24
End: 2019-05-15

## 2019-05-15 DIAGNOSIS — Z71.85 IMMUNIZATION COUNSELING: ICD-10-CM

## 2019-06-26 ENCOUNTER — HOSPITAL ENCOUNTER (OUTPATIENT)
Dept: LAB | Facility: MEDICAL CENTER | Age: 24
End: 2019-06-26
Payer: COMMERCIAL

## 2019-06-26 LAB
BDY FAT % MEASURED: 42.9 %
BP DIAS: 84 MMHG
BP SYS: 117 MMHG
CHOLEST SERPL-MCNC: 197 MG/DL (ref 100–199)
DIABETES HTDIA: NO
EVENT NAME HTEVT: NORMAL
FASTING HTFAS: YES
GLUCOSE SERPL-MCNC: 88 MG/DL (ref 65–99)
HDLC SERPL-MCNC: 58 MG/DL
HYPERTENSION HTHYP: NO
LDLC SERPL CALC-MCNC: 116 MG/DL
SCREENING LOC CITY HTCIT: NORMAL
SCREENING LOC STATE HTSTA: NORMAL
SCREENING LOCATION HTLOC: NORMAL
SMOKING HTSMO: NO
SUBSCRIBER ID HTSID: NORMAL
TRIGL SERPL-MCNC: 117 MG/DL (ref 0–149)

## 2019-07-28 DIAGNOSIS — N93.8 DUB (DYSFUNCTIONAL UTERINE BLEEDING): ICD-10-CM

## 2019-07-29 RX ORDER — NORETHINDRONE ACETATE AND ETHINYL ESTRADIOL 1.5-30(21)
KIT ORAL
Refills: 0 | OUTPATIENT
Start: 2019-07-29

## 2019-08-03 ENCOUNTER — PATIENT MESSAGE (OUTPATIENT)
Dept: MEDICAL GROUP | Age: 24
End: 2019-08-03

## 2019-08-03 DIAGNOSIS — N93.8 DUB (DYSFUNCTIONAL UTERINE BLEEDING): ICD-10-CM

## 2019-08-05 RX ORDER — NORETHINDRONE ACETATE AND ETHINYL ESTRADIOL 1.5-30(21)
1 KIT ORAL DAILY
Qty: 84 TAB | Refills: 11 | Status: SHIPPED | OUTPATIENT
Start: 2019-08-05 | End: 2020-02-08 | Stop reason: SDUPTHER

## 2019-08-05 NOTE — PATIENT COMMUNICATION
Was the patient seen in the last year in this department? Yes    Does patient have an active prescription for medications requested? No     Received Request Via: Patient      Pt would like Rx sent to    [facility] - [address] [address]  Phone: [phone] Fax: [phone]    Pharmacy qued up

## 2019-08-09 ENCOUNTER — PATIENT MESSAGE (OUTPATIENT)
Dept: MEDICAL GROUP | Age: 24
End: 2019-08-09

## 2019-09-24 ENCOUNTER — IMMUNIZATION (OUTPATIENT)
Dept: OCCUPATIONAL MEDICINE | Facility: CLINIC | Age: 24
End: 2019-09-24

## 2019-09-24 DIAGNOSIS — Z23 NEED FOR VACCINATION: ICD-10-CM

## 2019-09-24 PROCEDURE — 90686 IIV4 VACC NO PRSV 0.5 ML IM: CPT | Performed by: PREVENTIVE MEDICINE

## 2019-12-10 ENCOUNTER — OFFICE VISIT (OUTPATIENT)
Dept: MEDICAL GROUP | Age: 24
End: 2019-12-10
Payer: COMMERCIAL

## 2019-12-10 VITALS
BODY MASS INDEX: 44.01 KG/M2 | WEIGHT: 248.4 LBS | TEMPERATURE: 97.6 F | OXYGEN SATURATION: 97 % | HEART RATE: 96 BPM | SYSTOLIC BLOOD PRESSURE: 120 MMHG | HEIGHT: 63 IN | DIASTOLIC BLOOD PRESSURE: 68 MMHG

## 2019-12-10 DIAGNOSIS — J02.9 PHARYNGITIS, UNSPECIFIED ETIOLOGY: ICD-10-CM

## 2019-12-10 DIAGNOSIS — R10.13 DYSPEPSIA: ICD-10-CM

## 2019-12-10 LAB
INT CON NEG: NEGATIVE
INT CON POS: POSITIVE
S PYO AG THROAT QL: NEGATIVE

## 2019-12-10 PROCEDURE — 99214 OFFICE O/P EST MOD 30 MIN: CPT | Performed by: PHYSICIAN ASSISTANT

## 2019-12-10 PROCEDURE — 87880 STREP A ASSAY W/OPTIC: CPT | Performed by: PHYSICIAN ASSISTANT

## 2019-12-10 SDOH — HEALTH STABILITY: MENTAL HEALTH: HOW OFTEN DO YOU HAVE 6 OR MORE DRINKS ON ONE OCCASION?: LESS THAN MONTHLY

## 2019-12-10 ASSESSMENT — PAIN SCALES - GENERAL: PAINLEVEL: 1=MINIMAL PAIN

## 2019-12-10 NOTE — PROGRESS NOTES
"  Subjective:     Chief Complaint   Patient presents with   • Chest Pain     when eating \"bad foods\"   • Pharyngitis     possibly strep     Danyelle Arevalo is a 24 y.o. female here today for evaluation and management of:    Dyspepsia  New problem.  Patient reports new onset of chest discomfort while she is eating \"bad foods.\"  If she is eating healthier alternatives she does not have this discomfort.  Describes it as a burn.  Reports also she takes a deep breath when the pain is occurring it makes it worse.  No pain at rest.  No pain with activity.  No palpitations.  She denies HA, blurred vision, or dizziness.  No nausea, vomiting or diarrhea.  No bloating.  No pain with lying down.      Pharyngitis  Patient reports she is developed sore throat over the last couple days.  She has had strep throat 4 times in the last few years and is scared that this may be early stages of strep.  However, she reports sore throat seems to be improving a little bit.  She denies any fever, chills or body aches.  No nasal congestion.  Occasional right ear is popping/making a crackling noise.  No treatments tried.    ROS   Denies any recent fevers or chills. No nausea or vomiting. No diarrhea. No chest pains or shortness of breath. No lower extremity edema.    Allergies   Allergen Reactions   • Latex      When apply will cause rash       Current medicines (including changes today)  Current Outpatient Medications   Medication Sig Dispense Refill   • norethindrone-ethinyl estradiol-iron (BLISOVI FE 1.5/30) 1.5-30 MG-MCG tablet Take 1 Tab by mouth every day. 84 Tab 11   • ondansetron (ZOFRAN) 4 MG Tab tablet Take 1 Tab by mouth every four hours as needed for Nausea/Vomiting. 10 Tab 0     No current facility-administered medications for this visit.        Patient Active Problem List    Diagnosis Date Noted   • Morbid obesity with BMI of 40.0-44.9, adult ([facility]) 11/28/2016   • DUB (dysfunctional uterine bleeding) 02/01/2015   • H/O iron " "deficiency secondary to DUB 02/01/2015   • Tension headache 12/12/2014       Family History   Problem Relation Age of Onset   • Hypertension Maternal Grandmother    • Cancer Maternal Grandfather 62        colon   • Diabetes Maternal Grandfather    • Hyperlipidemia Father    • Hyperlipidemia Maternal Uncle    • Hyperlipidemia Paternal Aunt    • Hyperlipidemia Paternal Aunt    • Hyperlipidemia Maternal Uncle    • Cancer Mother 47        Breast   • Cancer Paternal Grandmother         breast          Objective:     Vitals:    12/10/19 0752   BP: 120/68   BP Location: Left arm   Patient Position: Sitting   BP Cuff Size: Adult long   Pulse: 96   Temp: 36.4 °C (97.6 °F)   SpO2: 97%   Weight: 112.7 kg (248 lb 6.4 oz)   Height: 1.6 m (5' 3\")     Body mass index is 44 kg/m².     Physical Exam:  Gen: Well developed, well nourished in no acute distress.  Morbidly obese female.  Skin: Pink, warm, and dry  HEENT: conjunctiva non-injected, sclera non-icteric. EOMs intact.   Nasal mucosa without edema nor erythema. No facial tenderness  Pinna normal. TM pearly gray.   Oral mucous membranes pink and moist with no lesions.  Neck: Supple, trachea midline. No adenopathy or masses in the neck or supraclavicular regions.  Lungs: Effort is normal. Clear to auscultation bilaterally with good excursion.  Chest wall tenderness on left pectoral muscles near sternum.  CV: regular rate and rhythm.  Abdomen: soft, nontender, + BS. No HSM.  No CVAT  Ext: no edema, color normal, vascularity normal, temperature normal  Alert and oriented Eye contact is good, speech goal directed, affect calm    Results for orders placed or performed in visit on 12/10/19   POCT Rapid Strep A   Result Value Ref Range    Rapid Strep Screen Negative     Internal Control Positive Positive     Internal Control Negative Negative      Reviewed and discussed above labs with patient in office.      Assessment and Plan:   The following treatment plan was discussed:     1. " Dyspepsia  -Discussed antireflux measures: smaller portions, avoid lying down after meals, normalize body weight, avoid common reflux triggers such as spicy, greasy foods, peppers, onions. Avoid caffeine, carbonation, alcohol, tobacco Raise the head of  bed by placing bricks or phone books between the floor and the legs of the head board.  Use OTC medicines such as TUMS, Rolaids, Maalox, Gaviscon, Zantac, Pepcid, Tagamet   If no improvement she will let me know through my chart message and we will try omeprazole daily for short stent of time.  Discussed EKG in office and she declines need.  -Stretches demonstrated and performed in office for pectoralis major muscle.  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  Encouraged her to consider ergonomic evaluation by her employer.    2. Pharyngitis, unspecified etiology  - Discussed over-the-counter medications to use for symptomatic relief. Keep well-hydrated and rest.  - Educated patient that on natural course of benign viral URI's.   - Twice daily use of nasal saline rinse or Neti-Pot encouraged.  - OTC anti-pyretics and decongestants as needed.  - Follow-up for worsening symptoms, difficulty breathing, lack of expected recovery, or should new symptoms or problems arise.  - Discussed pathophysiology of eustachian tube dysfunction. Advised Flonase daily for next few weeks (or remainder of season).   - Encouraged gentle autoinsufflation exercises.  - decongestants if needed  - POCT Rapid Strep A      -Any change or worsening of signs or symptoms, patient encouraged to follow-up or report to emergency room for further evaluation. Patient verbalizes understanding and agrees.    Followup: Annual preventive next year, sooner if needed         This dictation was created using voice recognition software. The accuracy of the dictation is limited to the abilities of  the software. I expect there may be some errors of grammar and possibly content.

## 2020-01-30 ENCOUNTER — OFFICE VISIT (OUTPATIENT)
Dept: HEALTH INFORMATION MANAGEMENT | Facility: MEDICAL CENTER | Age: 25
End: 2020-01-30
Payer: COMMERCIAL

## 2020-01-30 DIAGNOSIS — R63.2 BINGE EATING: ICD-10-CM

## 2020-01-30 DIAGNOSIS — E66.01 MORBID OBESITY WITH BMI OF 40.0-44.9, ADULT (HCC): ICD-10-CM

## 2020-01-30 DIAGNOSIS — R63.5 WEIGHT GAIN: ICD-10-CM

## 2020-01-30 DIAGNOSIS — E78.00 HYPERCHOLESTEROLEMIA: ICD-10-CM

## 2020-01-30 DIAGNOSIS — K21.9 GASTROESOPHAGEAL REFLUX DISEASE WITHOUT ESOPHAGITIS: ICD-10-CM

## 2020-01-30 DIAGNOSIS — R53.82 CHRONIC FATIGUE: ICD-10-CM

## 2020-01-30 PROCEDURE — 97802 MEDICAL NUTRITION INDIV IN: CPT | Performed by: DIETITIAN, REGISTERED

## 2020-01-30 PROCEDURE — 99205 OFFICE O/P NEW HI 60 MIN: CPT | Performed by: INTERNAL MEDICINE

## 2020-01-30 PROCEDURE — 93000 ELECTROCARDIOGRAM COMPLETE: CPT | Performed by: INTERNAL MEDICINE

## 2020-01-31 NOTE — PROGRESS NOTES
"1/30/2020     Danyelle Arevalo 24 y.o.        Time in/out: 4:35-5:05pm    Anthropometrics/Objective  Vitals:    01/30/20 1554   BP: 124/80   Height: 1.594 m (5' 2.75\")     BMI: Body mass index is 44.35 kg/m².  Stated Goal Weight: 140-200 lbs  See comprehensive patient history form for further information     Subjective:  Pt is here today for the initial screening visit for the medical weight management program.     - sometimes skips meals  - has been interested in intermittent fasting  - usually skips dinner due to IF but snacks on sweets later  - has been having fruit instead of sweets lately  - does written tracking already   - will continue this  - states she does not snack much    See HIP Medical Questionnaire in media for more detailed diet history     B - usually skips or starbucks drink or 1/2 burrito or doughnut  L - veggie stir cornejo rice and apples or tacos or pb and j and cucumber   D - hot pockets and veggies, carne asada fries, 1/2 frozen pizza, or skip recently  S - cuties, aleksandra's kisses, m&ms, fruit  Drinks: water  Nutrition Diagnosis (PES Statement)  · Obesity related to excessive energy intake and inadequate energy expenditure as evidenced by BMI >30     Client history:  Condition(s) associated with a diagnosis or treatment / Pertinent Medical Hx: morbid obesity, hyperchoelsterolemia, chronic fatigue, binge eating, GERD    Biochemical data, medical test and procedures  No results found for: HBA1C  No results found for: POCGLUCOSE  Lab Results   Component Value Date/Time    CHOLSTRLTOT 197 06/26/2019 06:13 AM     (H) 06/26/2019 06:13 AM    HDL 58 06/26/2019 06:13 AM    TRIGLYCERIDE 117 06/26/2019 06:13 AM         Nutrition Intervention  Nutrition Prescription  Recommended Daily Kcals: 1700 Kcal based on REE of 1859 (MSJ)   Recommended Daily Protein: 100g or ~30% of kcals      Meal Plan Recommendation   Calorie controlled, high protein, low carb diet    with 0-1 meal replacements per " day  1-2 snacks; 1 oz protein + non-starchy veggies or 1 fruit      Comprehensive Nutrition Education Instruction or training leading to in-depth nutrition related knowledge about:   Benefits to following meal plan, combine carb, protein and fat at each meal, meal timing and spacing, portion control, sweets and alcohol in moderation.  Handouts provided regarding topics discussed:   - Meal Plan   - My Plate Planner    - Snack list with fruit   - Meal ideas   - Food and Activity Journal    Monitoring & Evaluation Plan    Behavioral-Environmental:  Behavior: Keep a food journal and bring to next appointment   Physical activity: Did not discuss today     Food / Nutrient Intake:  Food intake: Follow meal plan as discussed. Avoid concentrated sweets and processed carbs. Use the plate method for portion control and macronutrient balance. Limit carbs/starch to up to 1 cup per meal. Snacks should be 1oz protein + ns veggies or fruit. Fruit once per day.     Fluid intake: Consume at least 64 oz water per day. Avoid all sweetened beverages. Limit coffee to 1 cup a day (ideally no cream or sugar). Limit or avoid alcohol as discussed with Dr. Rodarte.     Physical Signs / Symptoms:  Weight loss towards BMI < 30   Weight change: loss of 1-2lbs/week    Assessment Notes:  Today I oriented Danyelle to Dr. Archibald's Medical Weight Management program. Encouraged a higher protein, lower carbohydrate, and calorie controlled meal plan consisting of 3 meals and 1-2 snacks per day with optional use of meal replacements. Encouraged patient to track their food/beverage intake on My fitness Pal or utilize a written food journal.  We discussed how to build protein into each meal and snack, incorporating 1/2 plate non-starchy vegetable twice daily, optional 1/2 cup of complex carbohydrate at meals if desired, and avoiding refined carbohydrates and simple sugars. Reviewed snack guidelines to include 1 oz protein and optional non-starchy  vegetable or 1 serving of fruit.      Danyelle will be tracking via written food journal. She will be focusing on portions as we track. Discussed meal timing and establishing regular meal times as a benefit. She states she will try regular meal timings vs. Intermittent fasting for now.  We discussed snack options rather than sweet treats after dinner as well as the possiblity of a ND snack bar or other type of protein bar (privded list) for now until she can adjust to more NS veggies as her go to snack. Provided Danyelle with a ND shake sample to try if she likes.    Goals:  1. Meal timings! Try not to skip!  2. Tracking - use hand portions that we learned today  3. Fruit or protein bar if you need something sweet as a snack but try to aim for NS veggies    F/U: 4-6 weeks INA/MD

## 2020-01-31 NOTE — PROGRESS NOTES
Bariatric Medicine H&P  Chief Complaint   Patient presents with   • Weight Gain       Referred by:  Shiela Jasmine P.A.-C.    History of Present Illness:   Danyelle Arevalo is a 24 y.o.  female who presents for weight management and to help address co-morbidities caused by overweight, as below.    The patient has a coworker who is getting bariatric surgery, enjoys the classes here she is attending and the patient is interested in meeting the dietitian for classes, to work on weight loss.  She is not currently interested in anti-obesity medication, but wants to consider meal replacement shakes.  She gets discouraged by how slow weight loss is, gets bored easily with weight loss programs.  She has tried weight watchers and fit girl in the past.  Diet and exercise work best for her.  When she was tracking with my fitness pal, she was eating around 1500 kcals per day, which did help her with weight loss.    More recently, she is keeping a written journal fit planner.    Brief Diet History (see also RD notes):  AM: Skipping or having oats  Lunch: Peanut butter and jelly  Dinner: Takeout meals or skips  Snacks: Many sweets before bed!  Drinks: 24 ounces water    HLD: Not on statin or fenofibrate  Chronic fatigue: Does not think she is sleeping too poorly    Behavior-Related History:  Binge eating screen: Positive  H/o abuse: Negative     Exercise:   None     Review of Systems   Positive for chronic fatigue, lack of energy, chest pain and palpitations at times, GERD, back pain, depression, vertigo  Sleep apnea screen: Negative  All other ROS were reviewed with patient today and are negative.      PMH/PSH:  I have reviewed the patient's medical, social and family history, allergies, and medications today.  Prior records reviewed.  Personal Hx of Bariatric Surgery: None, patient not interested  [facility]    Physical Exam:   /80 (BP Location: Left arm, Patient Position: Sitting, BP Cuff Size: Large  "adult)   Pulse (!) 129   Ht 1.594 m (5' 2.75\")   SpO2 97%   BMI 44.35 kg/m²   Waist Measurement   Waist: 42.5 inch/inches  Body fat % 52  REE 2126 kcal/day    Constitutional: Oriented to person, place, and time and well-developed, well-nourished, and in no distress.    HENT: No facial plethora.  No Cushingoid features.  No scalloped tongue.  No dental erosions.  No swollen parotids.  Head: Normocephalic.   Eyes: EOM are normal. Pupils are equal, round, and reactive to light. No periorbital edema.  No lateral thinning of eyebrows.  No vertical nystagmus.  Neck: Normal range of motion. Neck supple. No thyromegaly present. No buffalo hump.  Cardiovascular: Tachycardia, rate 102 and regular rhythm.  No murmur heard.  Pulmonary/Chest: Effort normal and breath sounds normal. No wheezes.   Abdominal: Soft. Bowel sounds are normal.  Grade 1 pannus.  No ascites.  No hepatosplenomegaly.   Musculoskeletal: Normal range of motion. No edema.   Neurological: Alert and oriented to person, place, and time. Normal reflexes. No cranial nerve deficit. No muscle weakness.  Gait normal.   Skin: Warm and dry. Not diaphoretic. No hirsuitism.  No acanthosis nigricans.  Not excessively dry, scaly.  Mild facial acne.  No bruising/ecchymosis.  No hyperpigmentation.  Neck acrochordon.    Psychiatric: Mood, memory, affect and judgment normal.     Laboratory:   Prior labs reviewed.  EKG: Sinus tachycardia, rate 110, no significant ST-T changes.  Corrected QT 0.449  Ordered and reviewed by me today.    Dietitian Assessment: I have reviewed the Dietitian's assessment related to this encounter.       ASSESSMENT/PLAN:  Body mass index is 44.35 kg/m².   Obesity Stage ([place]) 1; Class 3    1. Morbid obesity with BMI of 40.0-44.9, adult ([facility])     2. Hypercholesterolemia     3. Weight gain     4. Chronic fatigue     5. Binge eating         Given the patient's significant binge eating on high refined CHO intake foods, having difficulty with weight " loss, instead having weight gain.  Start tracking, recommend stimulus narrowing.  She likely will benefit from anti-obesity medication but defers at this time.  She needs to have patients with a weight loss process, as she easily gets bored without significant weight loss right away.  Continue to monitor lipids, fatigue level, which should improve with weight loss.    The patient and I have discussed at length and agree to the following recommendations, which are all addressing the above diagnoses:    Weight Goal: 5% wt loss at one month after start (pt goal weight is 140-200 lb)  Diet:   Wants to consider intermittent fasting  MR: Try 1 ND MR for 1 meal daily instead of skipping  High Protein/Low Carb Meals and 2 snacks between meals daily  >100 g protein, <100 g total carbs daily, less than 1600 kcals per day as a goal  Track daily intake with My Fitness Pal, bring to next visit  64+ oz water per day  Avoid night snacking on sweet!s  Physical Activity: Start [employer] gym elliptical 30 minutes times twice per week  Risk level for moderate/vigorous exercise program:   Low but develops knee pain with running  New Rx:   None.  Consider anti-obesity medication pending course  Behavior change:   Work on timing of meals  Follow-up: one month with MD, 2 wks with RD  Consider referral to bariatric surgery pending course with medical weight loss, but patient declines at this time.    Face to face time spent 60 minutes,  with >50% of time devoted to one on one counseling on weight management issues, as documented above.      Patient's body mass index is 44.35 kg/m². Exercise and nutrition counseling were performed at this visit.        Thank you for your referral!

## 2020-02-08 DIAGNOSIS — N93.8 DUB (DYSFUNCTIONAL UTERINE BLEEDING): ICD-10-CM

## 2020-02-10 RX ORDER — NORETHINDRONE ACETATE AND ETHINYL ESTRADIOL 1.5-30(21)
1 KIT ORAL DAILY
Qty: 84 TAB | Refills: 11 | Status: SHIPPED | OUTPATIENT
Start: 2020-02-10 | End: 2021-01-12 | Stop reason: SDUPTHER

## 2020-02-11 VITALS
BODY MASS INDEX: 43.21 KG/M2 | WEIGHT: 243.9 LBS | HEIGHT: 63 IN | HEART RATE: 129 BPM | SYSTOLIC BLOOD PRESSURE: 124 MMHG | OXYGEN SATURATION: 97 % | DIASTOLIC BLOOD PRESSURE: 80 MMHG

## 2020-02-12 ENCOUNTER — NON-PROVIDER VISIT (OUTPATIENT)
Dept: HEALTH INFORMATION MANAGEMENT | Facility: MEDICAL CENTER | Age: 25
End: 2020-02-12
Payer: COMMERCIAL

## 2020-02-12 DIAGNOSIS — E66.01 MORBID OBESITY WITH BMI OF 40.0-44.9, ADULT (HCC): ICD-10-CM

## 2020-02-12 PROCEDURE — 97803 MED NUTRITION INDIV SUBSEQ: CPT | Performed by: DIETITIAN, REGISTERED

## 2020-02-12 NOTE — PROGRESS NOTES
Nutrition Reassess: Medical Weight Management   Today's date: 2/12/2020  Referring Provider: No ref. provider found      Time: in/out 8:00-8:27  Visit#: 2    Subjective:  -written tracking  - has been stressed since last visit due to work and school  - sometimes eats when she does not feel hungry after classes  - has been having big breakfasts  -sometimes skips lunch recently  - trying to focus on 3 meals a day rather than intermittent fasting      Diet History  B: breakfast burrito from [employer] [facility]  L: been skipping this week  D: [employer], cereal with unsweetened almond milk, ramen, popcorn or PB and J  No snacks     Anthropometrics/Objective  Today's weight:  Pt politely declined weight at this visit  Starting weight/date 243.9 lbs               Meal Plan:   High protein, low carb with 0-1 meal replacements per day     ReAssesment/Notes:    Danyelle is working towards her weight loss goals. She states the pat two weeks, last week especially have been difficult to work on her goals. She states higher stress from work and school though does not feel it warrants referral to .   She noticed she was skipping lunch as she was still full from her breakfast due to hig fat breakfast burrito. She does not snack therefore was having a tough time getting NS veggies in. Working on regular meal timings of 3 meals a day is what we emphasized. She states she has been doing fast food/convenience foods primarily for dinners this week as well. Asked patient how she would feel about frozen meals however she would prefer making her own meals and hopes this change occurs between follow up. She also sometimes eats at her mom's for dinner which has led her to have an empty fridge so limited veggie snacks for now as well. She states she sometimes has foods after school on Tuesday and Thursday like ramen or carne asada fries late even though she knows she does not need them as she is not hungry. We focused on getting meals prepared  and following the plate for now to incorporate more NS veggies into diet.     Goals:  1. Swap convenience foods for NS veggies when you just want to eat to eat  2. Look at [employer] smiley for some meal ideas  3. Cook dinner at least 1x a week in next two weeks  4. Half pumps of syrup (even though SF) at [facility]    Follow-up: 2 weeks MD/INA

## 2020-02-27 ENCOUNTER — OFFICE VISIT (OUTPATIENT)
Dept: HEALTH INFORMATION MANAGEMENT | Facility: MEDICAL CENTER | Age: 25
End: 2020-02-27
Payer: COMMERCIAL

## 2020-02-27 VITALS
WEIGHT: 247.8 LBS | OXYGEN SATURATION: 97 % | HEIGHT: 63 IN | BODY MASS INDEX: 43.91 KG/M2 | DIASTOLIC BLOOD PRESSURE: 78 MMHG | HEART RATE: 94 BPM | SYSTOLIC BLOOD PRESSURE: 122 MMHG

## 2020-02-27 DIAGNOSIS — R63.2 BINGE EATING: ICD-10-CM

## 2020-02-27 DIAGNOSIS — R63.5 WEIGHT GAIN: ICD-10-CM

## 2020-02-27 DIAGNOSIS — E66.01 MORBID OBESITY WITH BMI OF 40.0-44.9, ADULT (HCC): ICD-10-CM

## 2020-02-27 DIAGNOSIS — E78.00 HYPERCHOLESTEROLEMIA: ICD-10-CM

## 2020-02-27 DIAGNOSIS — R53.82 CHRONIC FATIGUE: ICD-10-CM

## 2020-02-27 PROCEDURE — 99214 OFFICE O/P EST MOD 30 MIN: CPT | Performed by: INTERNAL MEDICINE

## 2020-02-27 PROCEDURE — 97803 MED NUTRITION INDIV SUBSEQ: CPT | Performed by: DIETITIAN, REGISTERED

## 2020-02-27 ASSESSMENT — PATIENT HEALTH QUESTIONNAIRE - PHQ9: CLINICAL INTERPRETATION OF PHQ2 SCORE: 0

## 2020-02-27 NOTE — PROGRESS NOTES
Bariatric Medicine Follow Up  Chief Complaint   Patient presents with   • Weight Gain       History of Present Illness:   Danyelle Arevalo is a 25 y.o. female who presents for weight management follow-up and to help address co-morbidities caused by overweight, as below.    During the patient's last visit, the following were discussed and recommended:  Weight Goal: 5% wt loss at one month after start (pt goal weight is 140-200 lb)  Diet:   Wants to consider intermittent fasting  MR: Try 1 ND MR for 1 meal daily instead of skipping  High Protein/Low Carb Meals and 2 snacks between meals daily  >100 g protein, <100 g total carbs daily, less than 1600 kcals per day as a goal  Track daily intake with My Fitness Pal, bring to next visit  64+ oz water per day  Avoid night snacking on sweet!s  Physical Activity: Start [employer] gym elliptical 30 minutes times twice per week  Risk level for moderate/vigorous exercise program:   Low but develops knee pain with running  New Rx:   None.  Consider anti-obesity medication pending course  Behavior change:   Work on timing of meals    Trying not to skip meals.  Still having FF in pm.  Has written journal, forgot to bring it in.  Her goal this month has been to make breakfast, bring in lunch but still having fast food for dinner because of her work schedule.  She goes from work to exercise to an evening class.  She thinks a meal replacement shake would work well for her.    She wants to consider anti-obesity medication but defers today.    HLD: Not on statin or fenofibrate, with last LDL mildly elevated, 6/2019  Chronic fatigue: No recent change    Exercise:   Workouts 2/wk  Cardio 30 min plus strength 30 min     Review of Systems   Fatigue unchanged.  Denies significant insomnia, weakness, lightheadedness or excessive cravings  All other ROS were reviewed and are otherwise unchanged from my previous visit with patient.    Physical Exam:    /78 (BP Location: Left arm, Patient  "Position: Sitting, BP Cuff Size: Large adult)   Pulse 94   Ht 1.594 m (5' 2.75\")   Wt 112.4 kg (247 lb 12.8 oz)   SpO2 97%   BMI 44.25 kg/m²   Waist Measurement   Waist: 39.75 inch/inches  Weight change since last visit: +4 lb   Waist Circum change since last visit: -3 in     Constitutional: Oriented to person, place, and time and well-developed, well-nourished, and in no distress.    Head: Normocephalic.   Musculoskeletal: Normal range of motion. No edema.   Neurological: Alert and oriented to person, place, and time. No muscle weakness.  Gait normal.   Skin: Warm and dry. Not diaphoretic.   Psychiatric: Mood, memory, affect and judgment normal.     Laboratory:   Recent labs reviewed.      Dietitian Assessment: I have reviewed the Dietitian's assessment related to this encounter.     ASSESSMENT/PLAN:  Body mass index is 44.25 kg/m².    Obesity Stage ([place]):  1; Class 3    1. Morbid obesity with BMI of 40.0-44.9, adult ([facility])     2. Hypercholesterolemia     3. Weight gain     4. Chronic fatigue     5. Binge eating       Although the patient has experienced weight gain since her last visit, she has worked on timing of meals and has lost waist circumference since her initial visit.  Continue to work on timing of meals as below, continue tracking.  Consider anti-obesity medication pending course for binge eating.  Consider also referral to bariatric surgery pending course with medical weight loss.  Monitor lipids as she progresses through the program.    The patient and I have discussed at length and agree to the following recommendations, which are all addressing the above diagnoses:    Weight Goal: 3-5% wt loss each month (pt goal weight is 140-200 lb)  Diet: Continue to work on timing of meals  Bringing B, L.  Have snack before workout, ND MR after workout  Physical Activity:  Gym 2 to 3 d/wk  Rx: Pt defers antiobesity meds for now  Side Effects: consent in chart  Behavior change: Avoid skipping " meals  Follow-up:  1 mo    Patient's body mass index is 44.25 kg/m². Exercise and nutrition counseling were performed at this visit.

## 2020-02-27 NOTE — PROGRESS NOTES
"Nutrition Reassess: Medical Weight Management   Today's date: 2/27/2020  Referring Provider: No ref. provider found      Time: in/out 7:47am-8:05am  Visit#: 3    Subjective:  - wll try ND MR for dinner  - started making her own lunches to take to work  - forgot to bring in food journal  - started working out  -  Has decreased [employer] frequency  - uses a SF creamer   - has been skipping B x1 week  - not sure how to snack and not overeat     Diet history:   Breakfast - skip or eggs   Snack - 0  Lunch - valentey cornejo with NS veggies  Snack - 0  Dinner - out - [employer] or [employer] etc.     Anthropometrics/Objective  Today's weight:    Vitals:    02/27/20 0735   BP: 122/78   Weight: 112.4 kg (247 lb 12.8 oz)   Height: 1.594 m (5' 2.75\")     BMI:  Body mass index is 44.25 kg/m².  Starting weight/date 243.9 lbs     Total weight change : +3.9 lbs            Meal Plan:   Higher protein, less carb with 0-1 meal replacements per day     ReAssesment/Notes:    Danyelle is working on lifestyle changes to get to her weight goals. She is incorporating more NS veggies into her diet by ekra cornejo that she takes to work rather than eating at the cafeteria. She has decreased some simple sugars in her diet as well through items like coffee and creamers. Some days she goes from work to gym to school and finds that she is generally hungry before she is going to workout (around 5pm) but does not know how or what to eat. We discussed snacks as a beneficial implement to help feed and understand her hunger and satiety cues. After her workout she will be utilizing a MR shake to help decrease her eating out habits. We will continually work on not skipping meals though Danyelle has made great progress in some of the changes she has made such as incorporating more NS veggies.   Would recommend reviewing nutrition basics at next visit.     Goals:  1. Keep up with NS veggie intake  2. Snack before workout; uses snack ideas list for this  3. ND " MR for dinner everyday      Follow-up: 4-6 weeks

## 2020-03-24 ENCOUNTER — OFFICE VISIT (OUTPATIENT)
Dept: HEALTH INFORMATION MANAGEMENT | Facility: MEDICAL CENTER | Age: 25
End: 2020-03-24
Payer: COMMERCIAL

## 2020-03-24 VITALS
HEIGHT: 63 IN | HEART RATE: 84 BPM | SYSTOLIC BLOOD PRESSURE: 122 MMHG | DIASTOLIC BLOOD PRESSURE: 78 MMHG | OXYGEN SATURATION: 97 % | WEIGHT: 246.5 LBS | BODY MASS INDEX: 43.68 KG/M2

## 2020-03-24 DIAGNOSIS — E66.01 MORBID OBESITY WITH BMI OF 40.0-44.9, ADULT (HCC): ICD-10-CM

## 2020-03-24 DIAGNOSIS — E78.00 HYPERCHOLESTEROLEMIA: ICD-10-CM

## 2020-03-24 DIAGNOSIS — R53.82 CHRONIC FATIGUE: ICD-10-CM

## 2020-03-24 DIAGNOSIS — R63.2 BINGE EATING: ICD-10-CM

## 2020-03-24 DIAGNOSIS — K21.9 GASTROESOPHAGEAL REFLUX DISEASE WITHOUT ESOPHAGITIS: ICD-10-CM

## 2020-03-24 PROCEDURE — 99214 OFFICE O/P EST MOD 30 MIN: CPT | Performed by: INTERNAL MEDICINE

## 2020-03-24 PROCEDURE — 97803 MED NUTRITION INDIV SUBSEQ: CPT | Performed by: DIETITIAN, REGISTERED

## 2020-03-24 RX ORDER — TOPIRAMATE 25 MG/1
25 TABLET ORAL 2 TIMES DAILY
Qty: 60 TAB | Refills: 0 | Status: SHIPPED | OUTPATIENT
Start: 2020-03-24 | End: 2020-04-21

## 2020-03-24 RX ORDER — PHENTERMINE HYDROCHLORIDE 37.5 MG/1
18.75 TABLET ORAL
Qty: 15 TAB | Refills: 0 | Status: SHIPPED | OUTPATIENT
Start: 2020-03-24 | End: 2020-05-02 | Stop reason: SDUPTHER

## 2020-03-24 ASSESSMENT — PATIENT HEALTH QUESTIONNAIRE - PHQ9: CLINICAL INTERPRETATION OF PHQ2 SCORE: 0

## 2020-03-24 ASSESSMENT — FIBROSIS 4 INDEX: FIB4 SCORE: 0.28

## 2020-03-24 NOTE — PROGRESS NOTES
"Nutrition Reassess: Medical Weight Management   Today's date: 3/24/2020  Referring Provider: No ref. provider found      Time: in/out 3:48-4:07 PM  Visit#: 4    Subjective:  - used ND MR galeano for 2 weeks after last visit then stopped (was tired of the same flavor shakes)  - has tracked for 2 days since last visit  - tends to eat a larger meal at night  - starting weight loss mediation (phentermine and Topamax)  - has done 1 home workout in the past month    Diet history:   Breakfast - overnight oats (1/4 cup oatmeal, 1/4 cup almond milk, 1/3 cup plain Greek yogurt, 1 tbs. PB, 1 tsp. alfonzo seeds, 1 banana  Snack - 0  Lunch -  [employer] Frozen Meal (360 kcal, 21g pro, 39g carb, 580mg sodium)  Snack - 0  Dinner - 2 cups tortilla chips, 1/3 cup salsa, 1 cup cheese, 1/3 cup deli meat    Anthropometrics/Objective  Today's weight:    Vitals:    03/24/20 1531   BP: 122/78   Weight: 111.8 kg (246 lb 8 oz)   Height: 1.594 m (5' 2.75\")     BMI:  Body mass index is 44.01 kg/m².  Starting weight/date 243.9 (01/30/2020)     Total weight change : +2.6 lb            Meal Plan:   Higher protein, less carb with 0-1 meal replacements per day     ReAssesment/Notes:  Danyelle has not been following her meal plan but wants to get back on track. She likes the accountability of RD visits. Based on her diet recall, she has been making healthier choices for breakfast and lunch and not skipping meals. She plans to start using 1 ND MR shake again for dinner. She has purchased different flavors so she does not get burnt out from the same flavor. Suggested for her to start tracking again on My Fitness Pal for at least 5-7 days before the next visit. Danyelle plans to increase her physical activity by starting new home workouts (squats and blogalaties videos). Pt set her goal to use 1 MR shake at dinner daily. Next visit suggest to review food journal and nutrition basics.     Follow-up: 2 weeks RD    "

## 2020-03-24 NOTE — PROGRESS NOTES
"Bariatric Medicine Follow Up  Chief Complaint   Patient presents with   • Weight Gain       History of Present Illness:   Danyelle Arevalo is a 25 y.o. female who presents for weight management follow-up and to help address co-morbidities caused by overweight, as below.    During the patient's last visit, the following were discussed and recommended:  Weight Goal: 3-5% wt loss each month (pt goal weight is 140-200 lb)  Diet: Continue to work on timing of meals  Bringing B, L.  Have snack before workout, ND MR after workout  Physical Activity:  Gym 2 to 3 d/wk  Rx: Pt defers antiobesity meds for now  Side Effects: consent in chart  Behavior change: Avoid skipping meals    After two wks on the program, no longer followed it.  She feels if she starts antiobesity medication, she is cheating.  The more we discuss, the more she is considering trying antiobesity medication.  She is not consistently using MR products.  She is not tracking her intake.  She is eating a very large lunch and dinner currently.    HLD:  Not on statin or fenofibrate  Chronic fatigue:  No change  GERD:  Sx controlled without regular PPI or H2 blocker    Exercise:   None, gym recently closed d/t coronavirus concerns     Review of Systems   Denies weakness or change in fatigue level.  Still having cravings.  All other ROS were reviewed and are otherwise unchanged from my previous visit with patient.    Physical Exam:    /78 (BP Location: Left arm, Patient Position: Sitting, BP Cuff Size: Large adult)   Pulse 84   Ht 1.594 m (5' 2.75\")   Wt 111.8 kg (246 lb 8 oz)   SpO2 97%   BMI 44.01 kg/m²   Waist Measurement   Waist: 39.25 inch/inches  Weight change since last visit:  -1.3 lb (+2.6 lb total)  Waist Circum change since last visit:  -0.5 in (-3 in total)    Constitutional: Oriented to person, place, and time and well-developed, well-nourished, and in no distress.    Head: Normocephalic.   Musculoskeletal: Normal range of motion. No " edema.   Neurological: Alert and oriented to person, place, and time. No muscle weakness.  Gait normal.   Skin: Warm and dry. Not diaphoretic.   Psychiatric: Mood, memory, affect and judgment normal.     Laboratory:   Recent labs reviewed.      Dietitian Assessment: I have reviewed the Dietitian's assessment related to this encounter.     ASSESSMENT/PLAN:  Body mass index is 44.01 kg/m².    Obesity Stage ([place]):  1; Class 3    1. Morbid obesity with BMI of 40.0-44.9, adult ([facility])  phentermine (ADIPEX-P) 37.5 MG tablet   2. Hypercholesterolemia     3. Chronic fatigue     4. Binge eating     5. Gastroesophageal reflux disease without esophagitis       Patient has struggled maintaining consistency, has resumed binge eating.  Start anti-obesity medication.  Encouraged her to resume meal replacement therapy but seems reluctant.  Focus on less refined food choices, as previously discussed.  Monitor lipids, GERD symptoms, currently controlled.  Fatigue stable.    The patient and I have discussed at length and agree to the following recommendations, which are all addressing the above diagnoses:    Weight Goal: 3-5% wt loss each month (pt goal weight is <200 lb to start)  Diet: Tried to resume:   Continue to work on timing of meals  Bringing B, L.  Have snack before workout, ND MR after workout  Physical Activity: Set activity goals now that her gym is closed  Rx: Start phentermine 18.75+ Topamax 25 every morning, second Topamax 25 mg at dinner daily  Side Effects: consent in chart  Behavior change: Increase activity level, strongly consider stimulus narrowing   Follow-up: 1 mo    Patient's body mass index is 44.01 kg/m². Exercise and nutrition counseling were performed at this visit.

## 2020-04-02 ENCOUNTER — TELEPHONE (OUTPATIENT)
Dept: HEALTH INFORMATION MANAGEMENT | Facility: MEDICAL CENTER | Age: 25
End: 2020-04-02

## 2020-04-02 NOTE — TELEPHONE ENCOUNTER
VOICEMAIL  1. Caller Name: Danyelle Arevalo                        Call Back Number:[phone] (home)       2. Message: Patient lvm, she started taking her medications, Phentermine and Topiramate. She said she is feeling dizzy and has some questions. Please call pt to advise on what to do.     3. Patient approves office to leave a detailed voicemail/MyChart message: yes      Left message for patient to not to use second Topamax dose for now.  She was instructed to just take phentermine 18.75 mg plus Topamax 25 mg every morning only for this month.  If she still has side effects, discontinue all medication, continue to work on food changes and timing of meals and use meal replacement as we previously discussed.  She is to call back with any questions.

## 2020-04-08 ENCOUNTER — TELEMEDICINE (OUTPATIENT)
Dept: HEALTH INFORMATION MANAGEMENT | Facility: MEDICAL CENTER | Age: 25
End: 2020-04-08
Payer: COMMERCIAL

## 2020-04-08 DIAGNOSIS — E66.01 MORBID OBESITY WITH BMI OF 40.0-44.9, ADULT (HCC): ICD-10-CM

## 2020-04-08 PROCEDURE — 98968 PH1 ASSMT&MGMT NQHP 21-30: CPT | Mod: CR | Performed by: DIETITIAN, REGISTERED

## 2020-04-08 NOTE — PROGRESS NOTES
Nutrition Reassess: Medical Weight Management   Today's date: 4/8/2020  Referring Provider: No ref. provider found      Time: in/out 2:40-3:01 PM  Visit#: 5    Subjective:  - started taking weight loss medication on Sunday (phentermine)  - pt concerned with loss of hunger cues d/t medication  - skipping breakfast daily, eating small portions at meals  - usually eats overnight oats at breakfast  - started tracking on MFP after last visit with RD in March  - using 1 ND MR shake at dinner    Diet history:   Breakfast - skips   Snack - 0  Lunch - 1/4 of [place] burrito bowl with chicken and veggies  Snack - 0  Dinner - 1/2 ND MR pudding  Drinks- 32 oz of water and coffee with 2 tbs plain creamer    Anthropometrics/Objective  Today's weight:  There were no vitals filed for this visit.  BMI:  There is no height or weight on file to calculate BMI.  Starting weight/date 243.9 (01/30/2020)   Total weight change : no weight taken at today's visit           Meal Plan:   Higher protein, less carb with 0-1 meal replacements per day     ReAssesment/Notes:  Since starting her weight loss medication on Sunday, Danyelle has been skipping meals more often and eating smaller portions. We reviewed meal timing together to help her to have more structure and to prevent skipping meals. Suggested for her to eat 3 meals per day. We discussed including protein at each meal. Reminded Danyelle to review the snack list with fruit for some ideas of proteins to include at breakfast. She asked about having a tangerine for breakfast so suggested that if she has a tangerine to include a protein with it. As she is only having 1/2 of the MR shake at dinner, suggested for her split it up and to have 1/2 of the shake ~5:00 PM and 1/2 of the shake ~7:00-8:00 PM. Pt plans to track on My Fitness Pal to track protein intake. Next visit suggest to review food journal and physical activity.    This encounter was conducted via phone.   Verbal consent was  obtained. Patient's identity was verified.    Follow-up: 2 weeks RD/MD

## 2020-04-21 RX ORDER — TOPIRAMATE 25 MG/1
25 TABLET ORAL 2 TIMES DAILY
Qty: 60 TAB | Refills: 0 | Status: SHIPPED | OUTPATIENT
Start: 2020-04-21 | End: 2020-08-17 | Stop reason: SDUPTHER

## 2020-04-21 NOTE — TELEPHONE ENCOUNTER
Received request via: Pharmacy    Was the patient seen in the last year in this department? Yes     lov       3/24/20  fv         4/28/20    Does the patient have an active prescription (recently filled or refills available) for medication(s) requested? yes

## 2020-04-24 ENCOUNTER — TELEPHONE (OUTPATIENT)
Dept: MEDICAL GROUP | Age: 25
End: 2020-04-24

## 2020-04-24 DIAGNOSIS — Z80.3 FAMILY HISTORY OF BREAST CANCER: ICD-10-CM

## 2020-04-24 NOTE — TELEPHONE ENCOUNTER
VOICEMAIL  1. Caller Name: Danyelle Arevalo                      Call Back Number: [phone] (home)     2. Message: Patient is wondering if there is any testing that she can get for BRACA. Patient states that she completed the healthy [place] project but she didn't get the outcome she needed. Please advise     3. Patient approves office to leave a detailed voicemail/MyChart message: no

## 2020-04-27 NOTE — TELEPHONE ENCOUNTER
Please call the patient - does she have family history of breast cancer in sister or mother? Would she like a referral to genetic counseling?

## 2020-04-28 ENCOUNTER — APPOINTMENT (OUTPATIENT)
Dept: HEALTH INFORMATION MANAGEMENT | Facility: MEDICAL CENTER | Age: 25
End: 2020-04-28
Payer: COMMERCIAL

## 2020-05-01 NOTE — TELEPHONE ENCOUNTER
Phone Number Called: [phone] (home)       Call outcome: Did not leave a detailed message. Requested patient to call back.    Message: lvm for pt to call back regarding message below

## 2020-05-02 DIAGNOSIS — E66.01 MORBID OBESITY WITH BMI OF 40.0-44.9, ADULT (HCC): ICD-10-CM

## 2020-05-04 RX ORDER — PHENTERMINE HYDROCHLORIDE 37.5 MG/1
18.75 TABLET ORAL
Qty: 15 TAB | Refills: 0 | Status: SHIPPED | OUTPATIENT
Start: 2020-05-04 | End: 2020-07-27

## 2020-05-04 NOTE — TELEPHONE ENCOUNTER
Phone Number Called: [phone] (home)       Call outcome: Spoke to patient regarding message below.    Message: pt stated that their grandmother and mother have breast cancer. Stated that they would like referral to genetic counseling

## 2020-05-05 NOTE — TELEPHONE ENCOUNTER
Received request via: Pharmacy    Was the patient seen in the last year in this department? Yes    lov     4/8/20  fv       None    Does the patient have an active prescription (recently filled or refills available) for medication(s) requested? yes

## 2020-05-06 ENCOUNTER — TELEPHONE (OUTPATIENT)
Dept: HEALTH INFORMATION MANAGEMENT | Facility: MEDICAL CENTER | Age: 25
End: 2020-05-06

## 2020-05-06 RX ORDER — PHENTERMINE HYDROCHLORIDE 37.5 MG/1
TABLET ORAL
Qty: 15 TAB | Refills: 0 | Status: SHIPPED | OUTPATIENT
Start: 2020-05-06 | End: 2020-05-06

## 2020-05-10 NOTE — PROGRESS NOTES
"Non face to face prolonged services of clinical data and chart information reviewed for a total time of 30 performed on 5/10 for Danyelle Vasquez  Reviewed were:    Referral    Previous encounters    Imaging all imaging including mammography, colonoscopies, CT/tomography, MRI/PET    Previous procedures all biopsy and surgical procedures including pathology analysis and interpretation    Notes      Z80.3 (ICD-10-CM) - Family history of breast cancer                         Media all personal and family clinical histories including molecular DNA germline results    Miscellaneous reports    Patient summaries family history as documented by referring clinician  Counseling, testing information and materials prepared  \"I spent 30 minutes  from 0800 to 0830 reviewing past records, prior to visit pertaining to genetic risk.\"     Torito Dasilva M.D.  edited  "

## 2020-05-11 ENCOUNTER — OFFICE VISIT (OUTPATIENT)
Dept: HEMATOLOGY ONCOLOGY | Facility: MEDICAL CENTER | Age: 25
End: 2020-05-11
Payer: COMMERCIAL

## 2020-05-11 VITALS
TEMPERATURE: 98.6 F | DIASTOLIC BLOOD PRESSURE: 86 MMHG | HEIGHT: 63 IN | WEIGHT: 232.37 LBS | SYSTOLIC BLOOD PRESSURE: 122 MMHG | OXYGEN SATURATION: 94 % | RESPIRATION RATE: 18 BRPM | BODY MASS INDEX: 41.17 KG/M2 | HEART RATE: 82 BPM

## 2020-05-11 DIAGNOSIS — Z80.3 FAMILY HISTORY OF BREAST CANCER: ICD-10-CM

## 2020-05-11 PROCEDURE — 99202 OFFICE O/P NEW SF 15 MIN: CPT | Performed by: MEDICAL GENETICS

## 2020-05-11 ASSESSMENT — FIBROSIS 4 INDEX: FIB4 SCORE: 0.28

## 2020-05-11 NOTE — PROGRESS NOTES
"GENETIC RISK ASSESSMENT    Danyelle Arevalo is a 25 y.o. year old patient who was referred by Kendy for cancer genetic risk assessment.    Chief Complaint: family history of cancer    HPI: The patient does not carry a cancer diagnosis  Her mother was diagnosed with breast cancer in her mid-40's. Also mat GF (colon) and GGM (breast)  Her mother was seen by me 4 years ago: a multigene Ambry panel was returned :\"negative for pathogenic variants or actionable mutation .\"  Review of personal and family histories suggested that a cancer genetic risk assessment was in order.    Review of Systems (ROS):  Constitutional N  Eyes N  ENT N   Respiratory N  Cardiovascular N  Gastrointestinal N  Genitourinary N  Musculoskeletal N  Skin N  Neurological N  Endocrine N  Psychiatric N  Hematologic/lymphatic N  Allergic/Immunologic none  All other systems reviewed and are negative.    History (Past/Family)  Family History:   Family History   Problem Relation Age of Onset   • Hypertension Maternal Grandmother    • Cancer Maternal Grandfather 62        colon   • Diabetes Maternal Grandfather    • Hyperlipidemia Father    • Hyperlipidemia Maternal Uncle    • Hyperlipidemia Paternal Aunt    • Hyperlipidemia Paternal Aunt    • Hyperlipidemia Maternal Uncle    • Cancer Mother 47        Breast   • Cancer Paternal Grandmother         breast      3 generation pedigree built   Yes   Eva empiric risk calculation No   Oracio II empiric risk calculation  No    Social History:       Social History     Socioeconomic History   • Marital status: Single     Spouse name: Not on file   • Number of children: Not on file   • Years of education: Not on file   • Highest education level: Not on file   Occupational History   • Not on file   Social Needs   • Financial resource strain: Not on file   • Food insecurity     Worry: Not on file     Inability: Not on file   • Transportation needs     Medical: Not on file     Non-medical: Not on file "   Tobacco Use   • Smoking status: Never Smoker   • Smokeless tobacco: Never Used   Substance and Sexual Activity   • Alcohol use: Yes     Alcohol/week: 0.6 oz     Types: 1 Shots of liquor per week     Binge frequency: Less than monthly   • Drug use: No   • Sexual activity: Never     Comment: never   Lifestyle   • Physical activity     Days per week: Not on file     Minutes per session: Not on file   • Stress: Not on file   Relationships   • Social connections     Talks on phone: Not on file     Gets together: Not on file     Attends Baptism service: Not on file     Active member of club or organization: Not on file     Attends meetings of clubs or organizations: Not on file     Relationship status: Not on file   • Intimate partner violence     Fear of current or ex partner: Not on file     Emotionally abused: Not on file     Physically abused: Not on file     Forced sexual activity: Not on file   Other Topics Concern   • Not on file   Social History Narrative   • Not on file       Patient Past History:  Past Medical History:   Diagnosis Date   • DUB (dysfunctional uterine bleeding) 2/1/2015   • H/O iron deficiency secondary to DUB 2/1/2015   • Major depression 12/12/2014   • Tension headache 12/12/2014           NCCN Testing Criteria Met                            NO with mother previously tested    Physical Exam  Constitutional    There were no vitals taken for this visit.        No PE done for covid 19 precautiosn     Assessment and Plan:  Patient with family history of cancer   HOWEVER MATERNAL TESTING FAILED TO IDENTIFY PATHOGENIC VARIANT. THE PATIENT CAN NOT INHERIT FROM HER MOTHER SOMETHING THAT HER MOTHER DOES NOT HAVE    I spent a total of 30 minutes of face to face time with >50% of time spent in counseling and coordination of care discussing matters stated in above note.    NO TESTING      Torito Dasilva M.D.

## 2020-06-15 ENCOUNTER — TELEMEDICINE (OUTPATIENT)
Dept: HEALTH INFORMATION MANAGEMENT | Facility: MEDICAL CENTER | Age: 25
End: 2020-06-15
Payer: COMMERCIAL

## 2020-06-15 DIAGNOSIS — R63.2 BINGE EATING: ICD-10-CM

## 2020-06-15 DIAGNOSIS — K21.9 GASTROESOPHAGEAL REFLUX DISEASE WITHOUT ESOPHAGITIS: ICD-10-CM

## 2020-06-15 DIAGNOSIS — E78.00 HYPERCHOLESTEROLEMIA: ICD-10-CM

## 2020-06-15 DIAGNOSIS — R53.82 CHRONIC FATIGUE: ICD-10-CM

## 2020-06-15 DIAGNOSIS — E66.01 MORBID OBESITY WITH BMI OF 40.0-44.9, ADULT (HCC): ICD-10-CM

## 2020-06-15 DIAGNOSIS — R63.5 WEIGHT GAIN: ICD-10-CM

## 2020-06-15 PROCEDURE — 99214 OFFICE O/P EST MOD 30 MIN: CPT | Mod: 95,CR | Performed by: INTERNAL MEDICINE

## 2020-06-15 NOTE — PROGRESS NOTES
This encounter was conducted via Zoom .   Verbal consent was obtained. Patient's identity was verified.      Bariatric Medicine Follow Up  Chief Complaint   Patient presents with   • Weight Gain       History of Present Illness:   Danyelle Arevalo is a 25 y.o. female who presents for weight management follow-up and to help address co-morbidities caused by overweight, as below.    During the patient's last visit, the following were discussed and recommended:  Weight Goal: 3-5% wt loss each month (pt goal weight is <200 lb to start)  Diet:   Continue to work on timing of meals  Bringing B, L.  Have snack before workout, ND MR after workout  Physical Activity: Set activity goals now that her gym is closed  Rx: Start phentermine 18.75+ Topamax 25 every morning, second Topamax 25 mg at dinner daily  Side Effects: consent in chart  Behavior change: Increase activity level, strongly consider stimulus narrowing     She has not been weighing herself recently.  She continues to use phentermine and Topamax as previously prescribed, finds it is helpful for appetite suppression.    She is usually eating lunch out with some friends from work, got bored with meal replacement for lunch.  She is not skipping lunch.  Is to resume 1 ND MR daily for lunch but alternate with food to avoid getting bored.    HLD: Not currently on statin or fenofibrate, with last LDL mildly elevated 6/2019  GERD: Symptoms stable    Exercise:   Workout buddies with coworkers, 20 minutes workout daily and walking more on weekends  He is more motivated for exercise     Review of Systems   Pt denies lightheadedness, weakness, worsening fatigue, excessive dry mouth, mood changes, paresthesias.  All other ROS were reviewed and are otherwise unchanged from my previous visit with patient.    Physical Exam:    There were no vitals taken for this visit.   Patient reported weight 230 pounds  Last weight: 232 pounds  Weight change since last visit: -2  lb    Constitutional: Oriented to person, place, and time and well-developed, well-nourished, and in no distress.    Psychiatric: Mood, memory, affect and judgment normal.     Laboratory:   Recent labs reviewed.      Dietitian Assessment: I have reviewed the Dietitian's assessment related to this encounter.     ASSESSMENT/PLAN:  There is no height or weight on file to calculate BMI.    Obesity Stage ([place]): 1; Class 3    1. Morbid obesity with BMI of 40.0-44.9, adult ([facility])     2. Hypercholesterolemia     3. Weight gain     4. Chronic fatigue     5. Binge eating     6. Gastroesophageal reflux disease without esophagitis       The patient continues to respond to anti-obesity medication, continues to work on timing of meals.  Binge eating appears improved and she continues to reverse her previous weight gain.  Monitor fatigue, GERD, lipids, currently stable.  She will need labs updated.  Continue increasing activity as she is doing.    The patient and I have discussed at length and agree to the following recommendations, which are all addressing the above diagnoses:    Weight Goal: 3-5% wt loss each month (pt goal weight is <200 lb)  Diet: Avoid skipping lunch  Lunch is either a meal out such as sandwich or ND MR  If weight loss plateau, resume tracking intake  Physical Activity: 20-min workout with colleagues daily, walking on weekends  Rx: Continue phentermine 18.75 every morning plus Topamax 25 every morning  Increase dosing if weight loss plateau  Side Effects: consent in chart  Behavior change: Continue increasing activity, mindful of timing of meals  Follow-up: 1 mo in office  Labs updated prior to refill of medication    Patient's body mass index is unknown because there is no height or weight on file. Exercise and nutrition counseling were performed at this visit.

## 2020-07-27 ENCOUNTER — OFFICE VISIT (OUTPATIENT)
Dept: MEDICAL GROUP | Age: 25
End: 2020-07-27
Payer: COMMERCIAL

## 2020-07-27 VITALS
SYSTOLIC BLOOD PRESSURE: 122 MMHG | HEART RATE: 95 BPM | BODY MASS INDEX: 42.88 KG/M2 | DIASTOLIC BLOOD PRESSURE: 78 MMHG | TEMPERATURE: 97.7 F | HEIGHT: 63 IN | OXYGEN SATURATION: 99 % | WEIGHT: 242 LBS

## 2020-07-27 DIAGNOSIS — Z56.6 STRESS AT WORK: ICD-10-CM

## 2020-07-27 DIAGNOSIS — E66.01 MORBID OBESITY WITH BMI OF 40.0-44.9, ADULT (HCC): ICD-10-CM

## 2020-07-27 DIAGNOSIS — F33.1 MODERATE EPISODE OF RECURRENT MAJOR DEPRESSIVE DISORDER (HCC): ICD-10-CM

## 2020-07-27 PROBLEM — F32.9 MAJOR DEPRESSIVE DISORDER: Status: ACTIVE | Noted: 2020-07-27

## 2020-07-27 PROCEDURE — 99214 OFFICE O/P EST MOD 30 MIN: CPT | Performed by: PHYSICIAN ASSISTANT

## 2020-07-27 RX ORDER — BUPROPION HYDROCHLORIDE 150 MG/1
150 TABLET ORAL EVERY MORNING
Qty: 30 TAB | Refills: 1 | Status: SHIPPED | OUTPATIENT
Start: 2020-07-27 | End: 2020-09-16

## 2020-07-27 SDOH — HEALTH STABILITY - MENTAL HEALTH: OTHER PHYSICAL AND MENTAL STRAIN RELATED TO WORK: Z56.6

## 2020-07-27 ASSESSMENT — PATIENT HEALTH QUESTIONNAIRE - PHQ9
2. FEELING DOWN, DEPRESSED, IRRITABLE, OR HOPELESS: 3
1. LITTLE INTEREST OR PLEASURE IN DOING THINGS: 1
5. POOR APPETITE OR OVEREATING: 3
SUM OF ALL RESPONSES TO PHQ QUESTIONS 1-9: 16
SUM OF ALL RESPONSES TO PHQ9 QUESTIONS 1 AND 2: 4
8. MOVING OR SPEAKING SO SLOWLY THAT OTHER PEOPLE COULD HAVE NOTICED. OR THE OPPOSITE, BEING SO FIGETY OR RESTLESS THAT YOU HAVE BEEN MOVING AROUND A LOT MORE THAN USUAL: 1
3. TROUBLE FALLING OR STAYING ASLEEP OR SLEEPING TOO MUCH: 1
6. FEELING BAD ABOUT YOURSELF - OR THAT YOU ARE A FAILURE OR HAVE LET YOURSELF OR YOUR FAMILY DOWN: 1
7. TROUBLE CONCENTRATING ON THINGS, SUCH AS READING THE NEWSPAPER OR WATCHING TELEVISION: 3
9. THOUGHTS THAT YOU WOULD BE BETTER OFF DEAD, OR OF HURTING YOURSELF: 1
4. FEELING TIRED OR HAVING LITTLE ENERGY: 2

## 2020-07-30 NOTE — PROGRESS NOTES
"  Subjective:     Chief Complaint   Patient presents with   • Depression     Danyelle Arevalo is a 25 y.o. female here today for evaluation and management of:    Moderate episode of recurrent major depressive disorder (HCC)/Stress at work  Patient reports under a lot of stress and feels like depression has returned. In teenage years would cut her legs and has thoughts of repeating. Feels she has more control now because she saw it wasn't helpful teenage years. \"Want to feel something.\" On meds in past, felt they helped. Stopped because she thought she was better.  Struggling at work--wants to go back to school and be per [place] doesn't feel employer is working with her. Personally offended by company policies.   Patient denies SI/HI.    PHQ:  Little interest or pleasure in doing things?: 1   Feeling down, depressed, or hopeless?: 3  Trouble falling or staying asleep, or sleeping too much? : 1  Feeling tired or having little energy? : 2  Poor appetite or overeating? : 3  Feeling bad about yourself - or that you are a failure or have let yourself or your family down? : 1  Trouble concentrating on things, such as reading the newspaper or watching television? : 3  Moving or speaking so slowly that other people could have noticed.  Or the opposite - being so fidgety or restless that you have been moving around alot more than usual. : 1  Thoughts that you would be better off dead, or of hurting yourself?: 1  Patient Health Questionnaire Score: 16      Depression Screen (PHQ-2/PHQ-9) 2/27/2020 3/24/2020 7/27/2020   PHQ-2 Total Score - - 4   PHQ-2 Total Score - - -   PHQ-2 Total Score 0 0 -   PHQ-9 Total Score - - 16   PHQ-9 Total Score - - -       Morbidly obese female  No current exercise. Diet is fair but finding binging.      ROS   Denies any recent fevers or chills. No nausea or vomiting. No diarrhea. No chest pains or shortness of breath. No lower extremity edema.  Taking supplements to help mood or symptoms: " no  Using alcohol or other substances to help mood or symptoms: no  No polydipsia, polyuria.  No temperature intolerance.  No bowel changes  Denies symptoms of ghazala: grandiosity, euphoria, need for little or no sleep, rapid pressured speech, spending sprees, reckless or risky behavior, hypersexual behavior.   No visual or auditory hallucinations.    Allergies   Allergen Reactions   • Latex      When apply will cause rash       Current medicines (including changes today)  Current Outpatient Medications   Medication Sig Dispense Refill   • buPROPion (WELLBUTRIN XL) 150 MG XL tablet Take 1 Tab by mouth every morning. 30 Tab 1   • norethindrone-ethinyl estradiol-iron (BLISOVI FE 1.5/30) 1.5-30 MG-MCG tablet Take 1 Tab by mouth every day. 84 Tab 11   • ondansetron (ZOFRAN) 4 MG Tab tablet Take 1 Tab by mouth every four hours as needed for Nausea/Vomiting. 10 Tab 0     No current facility-administered medications for this visit.        Patient Active Problem List    Diagnosis Date Noted   • Major depressive disorder 07/27/2020   • Hypercholesterolemia 01/30/2020   • Weight gain 01/30/2020   • Chronic fatigue 01/30/2020   • Binge eating 01/30/2020   • Gastroesophageal reflux disease without esophagitis 01/30/2020   • Morbid obesity with BMI of 40.0-44.9, adult ([facility]) 11/28/2016   • DUB (dysfunctional uterine bleeding) 02/01/2015   • H/O iron deficiency secondary to DUB 02/01/2015   • Tension headache 12/12/2014       Family History   Problem Relation Age of Onset   • Hypertension Maternal Grandmother    • Cancer Maternal Grandfather 62        colon   • Diabetes Maternal Grandfather    • Hyperlipidemia Father    • Hyperlipidemia Maternal Uncle    • Hyperlipidemia Paternal Aunt    • Hyperlipidemia Paternal Aunt    • Hyperlipidemia Maternal Uncle    • Cancer Mother 47        Breast   • Cancer Paternal Grandmother         breast          Objective:     Vitals:    07/27/20 1613   BP: 122/78   BP Location: Left arm   Patient  "Position: Sitting   BP Cuff Size: Adult long   Pulse: 95   Temp: 36.5 °C (97.7 °F)   TempSrc: Temporal   SpO2: 99%   Weight: 109.8 kg (242 lb)   Height: 1.6 m (5' 3\")     Body mass index is 42.87 kg/m².     Physical Exam:  Constitutional: Alert, no distress, well-groomed.  Skin: Warm, dry, good turgor, no rashes in visible areas.  Eye: Equal, round and reactive, conjunctiva clear, lids normal.  ENMT: Lips without lesions, good dentition, moist mucous membranes.  Neck: Trachea midline, no masses, no thyromegaly.  Respiratory: Unlabored respiratory effort, no cough.  Abdomen: Soft, no gross masses.  MSK: Normal gait, moves all extremities.  Neuro: Grossly non-focal. No cranial nerve deficit. Strength and sensation intact.   Psych: Alert and oriented x3, normal affect. Tearful.      Assessment and Plan:   The following treatment plan was discussed:   1. Moderate episode of recurrent major depressive disorder (HCC)  Patient Education:  Reviewed concept of biochemical imbalance of neurotransmitters and rationale for completing therapeutic course of treatment.   RTC 1 month  RTC sooner for symptoms including but not limited to: worsening depression, suicidal ideation, anxiety, agitation, panic attacks, insomnia, irritability, hostility, aggressiveness, impulsivity, hypomania and ghazala  If such symptoms are observed, consider calling the National Suicide Prevention Lifeline or 911, or transporting patient to the emergency department for immediate evaluation.   - buPROPion (WELLBUTRIN XL) 150 MG XL tablet; Take 1 Tab by mouth every morning.  Dispense: 30 Tab; Refill: 1  - Patient has been identified as having a positive depression screening. Appropriate orders and counseling have been given.    2. Stress at work  Reassurance provided. Advised to discuss options with HR and looking for opportunity to stay with organization.     3. Morbid obesity with BMI of 40.0-44.9, adult (HCC)  Patient's body mass index is 42.87 kg/m². " Exercise and nutrition counseling were performed at this visit.    Followup: Return in about 1 month (around 8/27/2020) for follow-up on wellbutrin, sooner if needed.         Patient was seen for 32 minutes face to face of which > 50% of appointment time was spent on counseling and coordination of care regarding the above.

## 2020-08-17 ENCOUNTER — OFFICE VISIT (OUTPATIENT)
Dept: HEALTH INFORMATION MANAGEMENT | Facility: MEDICAL CENTER | Age: 25
End: 2020-08-17
Payer: COMMERCIAL

## 2020-08-17 VITALS
HEIGHT: 63 IN | HEART RATE: 99 BPM | WEIGHT: 233.8 LBS | SYSTOLIC BLOOD PRESSURE: 120 MMHG | DIASTOLIC BLOOD PRESSURE: 78 MMHG | BODY MASS INDEX: 41.43 KG/M2 | OXYGEN SATURATION: 99 %

## 2020-08-17 DIAGNOSIS — R53.82 CHRONIC FATIGUE: ICD-10-CM

## 2020-08-17 DIAGNOSIS — Z71.3 DIETARY COUNSELING AND SURVEILLANCE: ICD-10-CM

## 2020-08-17 DIAGNOSIS — E66.01 MORBID OBESITY WITH BMI OF 40.0-44.9, ADULT (HCC): ICD-10-CM

## 2020-08-17 DIAGNOSIS — R63.2 BINGE EATING: ICD-10-CM

## 2020-08-17 DIAGNOSIS — E78.00 HYPERCHOLESTEROLEMIA: ICD-10-CM

## 2020-08-17 DIAGNOSIS — F33.1 MODERATE EPISODE OF RECURRENT MAJOR DEPRESSIVE DISORDER (HCC): ICD-10-CM

## 2020-08-17 PROCEDURE — 99401 PREV MED CNSL INDIV APPRX 15: CPT | Performed by: INTERNAL MEDICINE

## 2020-08-17 PROCEDURE — 99214 OFFICE O/P EST MOD 30 MIN: CPT | Mod: 25 | Performed by: INTERNAL MEDICINE

## 2020-08-17 RX ORDER — PHENTERMINE HYDROCHLORIDE 37.5 MG/1
TABLET ORAL
Qty: 15 TAB | Refills: 0 | Status: SHIPPED | OUTPATIENT
Start: 2020-08-17 | End: 2020-09-24

## 2020-08-17 RX ORDER — TOPIRAMATE 25 MG/1
25 TABLET ORAL DAILY
Qty: 30 TAB | Refills: 1 | Status: SHIPPED | OUTPATIENT
Start: 2020-08-17 | End: 2021-01-05

## 2020-08-17 ASSESSMENT — PATIENT HEALTH QUESTIONNAIRE - PHQ9: CLINICAL INTERPRETATION OF PHQ2 SCORE: 0

## 2020-08-17 NOTE — PROGRESS NOTES
"Bariatric Medicine Follow Up  Chief Complaint   Patient presents with   • Weight Gain       History of Present Illness:   Danyelle Arevalo is a 25 y.o. female who presents for follow-up to intensive behavioral modification of overweight and to help address co-morbidities caused by overweight, as below.    Obesity Stage/Class: 1/3  During the patient's last visit, the following were discussed and recommended:  Weight Goal: 3-5% wt loss each month (pt goal weight is <200 lb)  Diet: Avoid skipping lunch  Lunch is either a meal out such as sandwich or ND MR  If weight loss plateau, resume tracking intake  Physical Activity: 20-min workout with colleagues daily, walking on weekends  Rx: Continue phentermine 18.75 every morning plus Topamax 25 every morning  Increase dosing if weight loss plateau  Side Effects: consent in chart  Behavior change: Continue increasing activity, mindful of timing of meals      Challenges: Little exercise  Dietary adherence:  Good.  Having premade salad mix with chicken for L, D  Easy to prep meals helps her stay on track, drinking more water  Not tracking, eating similar meals daily  Exercise:  None    AntiObesity Medication use: Resumed phentermine/topir last month  Medication efficacy/tolerance/side effects: Very effective while taking, no side effects except dry mouth  Medication compliance: Taking daily every morning x1 month but had stopped taking for 3 months    Status of comorbid conditions/other medical diagnoses:  HLD: Controlled, not on statin or fenofibrate  Depression: Controlled on Wellbutrin       Review of Systems   Pt denies lightheadedness, weakness, worsening fatigue, excessive dry mouth, mood changes, paresthesias.  All other ROS were reviewed and are otherwise unchanged from my previous visit with patient.    Physical Exam:    /78 (BP Location: Left arm, Patient Position: Sitting, BP Cuff Size: Large adult)   Pulse 99   Ht 1.594 m (5' 2.75\")   Wt 106.1 kg " (233 lb 12.8 oz)   SpO2 99%   BMI 41.75 kg/m²   Waist Measurement   Waist: 37 inch/inches  Weight change since last visit:  -13 lb (-10 lb total)  Waist Circum change since last visit: -2.25 in (-5.5 in total)    Constitutional: Oriented to person, place, and time and well-developed, well-nourished, and in no distress.    Head: Normocephalic.   Musculoskeletal: Normal range of motion. No edema.   Neurological: Alert and oriented to person, place, and time. No muscle weakness.  Gait normal.   Skin: Warm and dry. Not diaphoretic.   Psychiatric: Mood, memory, affect and judgment normal.     Laboratory:   Recent labs reviewed.      Dietitian Assessment: I have reviewed the Dietitian's assessment related to this encounter.     ASSESSMENT  25 y.o.  female presents for intensive lifestyle intervention for treatment of obesity and co-morbid conditions.  Obesity Stage ([place]) 1; Class 3    1. Chronic fatigue     2. Binge eating     3. Hypercholesterolemia     4. Morbid obesity with BMI of 40.0-44.9, adult ([facility])  phentermine (ADIPEX-P) 37.5 MG tablet   5. Moderate episode of recurrent major depressive disorder (HCC)         Patient has made really good progress towards weight loss, with binge eating under better control and less fatigue.  Continue current anti-obesity medication.  Work on increasing activity level.  She has created her own stimulus narrowing, recommend continuing.  Fatigue improving.  Monitor lipids, mood.    PLAN  Weight Goal: 5% wt loss at one month after start (pt goal weight is less than 200 lb)  Dietary intervention:    MR:  none  High Protein/Low Carb whole food meals and 2 snacks between meals daily  Not tracking  64+ oz water per day  Physical Activity:  At least 10 min daily of arm wts, with instruction sheet given  Labs: N/A  Rx changes:   Continue phentermine 18.75+ Topamax 25 mg every morning  Side Effects: Risks, benefits, alternatives discussed, consent signed.  Behavior modification:    Increase activity level  Surgical considerations: Consider referral to bariatric surgery if unsuccessful with medical weight loss  Follow-up: one month with MD, weekly to biweekly for preventive obesity counseling    Patient's body mass index is 41.75 kg/m². Exercise and nutrition counseling were performed at this visit.        >>>>>>>>>>>>>      PREVENTIVE SERVICES COUNSELING FOR OBESITY NOTE    O:  Body mass index is 41.75 kg/m²., see also vitals flowsheet for today  Pt struggling with:  Keeping food diary/tracking consumption  Taking medications as prescribed  A:  Dietary counseling and surveillance for obesity  Z71.3, E66.01, BMI Code: Z68.41  P:  Counseled pt on reduced kcal, reduced refined CHO whole food meal plan  Advised exercise: Arm weights  Discussed strategies of self-monitoring with:   food diary  cognitive restructuring  stress reduction  stimulus control  Meal pre-planning  Environmental management  Time: 14 min

## 2020-08-31 ENCOUNTER — TELEMEDICINE (OUTPATIENT)
Dept: MEDICAL GROUP | Age: 25
End: 2020-08-31
Payer: COMMERCIAL

## 2020-08-31 VITALS — HEIGHT: 63 IN | WEIGHT: 225 LBS | BODY MASS INDEX: 39.87 KG/M2

## 2020-08-31 DIAGNOSIS — Z56.6 STRESS AT WORK: ICD-10-CM

## 2020-08-31 DIAGNOSIS — R63.4 WEIGHT LOSS: ICD-10-CM

## 2020-08-31 DIAGNOSIS — E66.01 MORBID OBESITY WITH BMI OF 40.0-44.9, ADULT (HCC): ICD-10-CM

## 2020-08-31 DIAGNOSIS — F33.1 MODERATE EPISODE OF RECURRENT MAJOR DEPRESSIVE DISORDER (HCC): ICD-10-CM

## 2020-08-31 PROCEDURE — 99213 OFFICE O/P EST LOW 20 MIN: CPT | Mod: 95,CR | Performed by: PHYSICIAN ASSISTANT

## 2020-08-31 SDOH — HEALTH STABILITY: MENTAL HEALTH: HOW MANY STANDARD DRINKS CONTAINING ALCOHOL DO YOU HAVE ON A TYPICAL DAY?: 1 OR 2

## 2020-08-31 SDOH — HEALTH STABILITY: MENTAL HEALTH: HOW OFTEN DO YOU HAVE 6 OR MORE DRINKS ON ONE OCCASION?: NEVER

## 2020-08-31 SDOH — HEALTH STABILITY: MENTAL HEALTH: HOW OFTEN DO YOU HAVE A DRINK CONTAINING ALCOHOL?: NEVER

## 2020-08-31 SDOH — HEALTH STABILITY - MENTAL HEALTH: OTHER PHYSICAL AND MENTAL STRAIN RELATED TO WORK: Z56.6

## 2020-08-31 ASSESSMENT — PATIENT HEALTH QUESTIONNAIRE - PHQ9
SUM OF ALL RESPONSES TO PHQ QUESTIONS 1-9: 3
8. MOVING OR SPEAKING SO SLOWLY THAT OTHER PEOPLE COULD HAVE NOTICED. OR THE OPPOSITE, BEING SO FIGETY OR RESTLESS THAT YOU HAVE BEEN MOVING AROUND A LOT MORE THAN USUAL: 0
7. TROUBLE CONCENTRATING ON THINGS, SUCH AS READING THE NEWSPAPER OR WATCHING TELEVISION: 0
SUM OF ALL RESPONSES TO PHQ9 QUESTIONS 1 AND 2: 0
4. FEELING TIRED OR HAVING LITTLE ENERGY: 1
2. FEELING DOWN, DEPRESSED, IRRITABLE, OR HOPELESS: 0
9. THOUGHTS THAT YOU WOULD BE BETTER OFF DEAD, OR OF HURTING YOURSELF: 0
6. FEELING BAD ABOUT YOURSELF - OR THAT YOU ARE A FAILURE OR HAVE LET YOURSELF OR YOUR FAMILY DOWN: 0
3. TROUBLE FALLING OR STAYING ASLEEP OR SLEEPING TOO MUCH: 1
5. POOR APPETITE OR OVEREATING: 1
1. LITTLE INTEREST OR PLEASURE IN DOING THINGS: 0

## 2020-08-31 NOTE — PROGRESS NOTES
Virtual Visit: Established Patient   This visit was conducted via Zoom  using secure and encrypted videoconferencing technology. The patient was in a private location in the state of [place].    The patient's identity was confirmed and verbal consent was obtained for this virtual visit.    Subjective:   CC:   Chief Complaint   Patient presents with   • Depression     Wellbutrin follow up       Danyelle Arevalo is a 25 y.o. female presenting for evaluation and management of:    Moderate episode of recurrent major depressive disorder (HCC)  Stable. Currently taking Wellbutrin 150 mg XL daily as prescribed.   Denies side effects and is tolerating well.  Mood is improved with current medication. Therapy once weekly.  Patient denies SI/HI.  Depression Screen (PHQ-2/PHQ-9) 7/27/2020 8/17/2020 8/31/2020   PHQ-2 Total Score 4 0 0   PHQ-9 Total Score 16 - 3     Stress at work  Reports a lot less stressed. 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 She is very happy.   Weight loss  Down 17 lbs since last visit.  Under care of HIP. Very pleased. Much more control over binge eating. Thinks Wellbutrin is helping.  Also taking Topamax and phentermine. Very pleased as clothes are fitting her better.  Wt Readings from Last 4 Encounters:   08/31/20 102.1 kg (225 lb)   08/17/20 106.1 kg (233 lb 12.8 oz)   07/27/20 109.8 kg (242 lb)        ROS   Denies any recent fevers or chills. No nausea or vomiting. No chest pains or shortness of breath.     Allergies   Allergen Reactions   • Latex      When apply will cause rash       Current medicines (including changes today)  Current Outpatient Medications   Medication Sig Dispense Refill   • phentermine (ADIPEX-P) 37.5 MG tablet TAKE 1/2 TABLET ORALLYEVERY MORNING BEFORE BREAKFAST FOR 30 DAYS E66.01 15 Tab 0   • topiramate (TOPAMAX) 25 MG Tab Take 1 Tab by mouth every day. Take am dose with phentermine. 30 Tab 1   • buPROPion (WELLBUTRIN XL)  "150 MG XL tablet Take 1 Tab by mouth every morning. 30 Tab 1   • norethindrone-ethinyl estradiol-iron (BLISOVI FE 1.5/30) 1.5-30 MG-MCG tablet Take 1 Tab by mouth every day. 84 Tab 11   • ondansetron (ZOFRAN) 4 MG Tab tablet Take 1 Tab by mouth every four hours as needed for Nausea/Vomiting. 10 Tab 0     No current facility-administered medications for this visit.        Patient Active Problem List    Diagnosis Date Noted   • Major depressive disorder 07/27/2020   • Hypercholesterolemia 01/30/2020   • Weight gain 01/30/2020   • Chronic fatigue 01/30/2020   • Binge eating 01/30/2020   • Gastroesophageal reflux disease without esophagitis 01/30/2020   • Morbid obesity with BMI of 40.0-44.9, adult ([facility]) 11/28/2016   • DUB (dysfunctional uterine bleeding) 02/01/2015   • H/O iron deficiency secondary to DUB 02/01/2015   • Tension headache 12/12/2014       Family History   Problem Relation Age of Onset   • Hypertension Maternal Grandmother    • Cancer Maternal Grandfather 62        colon   • Diabetes Maternal Grandfather    • Hyperlipidemia Father    • Hyperlipidemia Maternal Uncle    • Hyperlipidemia Paternal Aunt    • Hyperlipidemia Paternal Aunt    • Hyperlipidemia Maternal Uncle    • Cancer Mother 47        Breast   • Cancer Paternal Grandmother         breast       She  has a past medical history of DUB (dysfunctional uterine bleeding) (2/1/2015), H/O iron deficiency secondary to DUB (2/1/2015), Major depression (12/12/2014), and Tension headache (12/12/2014).  She  has a past surgical history that includes appendectomy (2007).       Objective:   Ht 1.594 m (5' 2.76\") Comment: Pt reported  Wt 102.1 kg (225 lb) Comment: Pt reported  LMP 08/22/2020   Breastfeeding No   BMI 40.16 kg/m²     Physical Exam  Constitutional: Alert, no distress, well-groomed. Morbidly obese female.  Skin: No rashes in visible areas.  Eye: Round. Conjunctiva clear, lids normal. No icterus.   ENMT: Lips pink without lesions, good dentition, " moist mucous membranes. Phonation normal.  Neck: No masses, no thyromegaly. Moves freely without pain.  Respiratory: Unlabored respiratory effort, no cough or audible wheeze  Psych: Alert and oriented x3, normal affect and mood.       Assessment and Plan:   The following treatment plan was discussed:     1. Moderate episode of recurrent major depressive disorder (HCC)  2. Stress at work  Improved. Stable. Continue current medicines. Follow-up as needed or in 6 months.   Encouraged her to stay focused on school.     3. Weight loss  4. Morbid obesity with BMI of 40.0-44.9, adult ([facility])  Applauded her for her weight loss. Encouraged continue dietary changes and increase in physical activity. Follow-up with specialist as directed.      Follow-up: Return in about 6 months (around 2/28/2021) for follow-up on wellbutrin, sooner if needed.

## 2020-09-12 ENCOUNTER — HOSPITAL ENCOUNTER (OUTPATIENT)
Dept: LAB | Facility: MEDICAL CENTER | Age: 25
End: 2020-09-12
Payer: COMMERCIAL

## 2020-09-12 LAB
BDY FAT % MEASURED: 42.7 %
BP DIAS: 84 MMHG
BP SYS: 134 MMHG
CHOLEST SERPL-MCNC: 201 MG/DL (ref 100–199)
DIABETES HTDIA: NO
EVENT NAME HTEVT: NORMAL
FASTING HTFAS: YES
GLUCOSE SERPL-MCNC: 92 MG/DL (ref 65–99)
HDLC SERPL-MCNC: 55 MG/DL
HYPERTENSION HTHYP: NO
LDLC SERPL CALC-MCNC: 129 MG/DL
SCREENING LOC CITY HTCIT: NORMAL
SCREENING LOC STATE HTSTA: NORMAL
SCREENING LOCATION HTLOC: NORMAL
SMOKING HTSMO: NO
SUBSCRIBER ID HTSID: NORMAL
TRIGL SERPL-MCNC: 83 MG/DL (ref 0–149)

## 2020-09-12 PROCEDURE — 36415 COLL VENOUS BLD VENIPUNCTURE: CPT

## 2020-09-12 PROCEDURE — 80061 LIPID PANEL: CPT

## 2020-09-12 PROCEDURE — 82947 ASSAY GLUCOSE BLOOD QUANT: CPT

## 2020-09-12 PROCEDURE — S5190 WELLNESS ASSESSMENT BY NONPH: HCPCS

## 2020-09-16 DIAGNOSIS — F33.1 MODERATE EPISODE OF RECURRENT MAJOR DEPRESSIVE DISORDER (HCC): ICD-10-CM

## 2020-09-16 RX ORDER — BUPROPION HYDROCHLORIDE 150 MG/1
TABLET ORAL
Qty: 90 TAB | Refills: 3 | Status: SHIPPED | OUTPATIENT
Start: 2020-09-16 | End: 2021-01-12 | Stop reason: SDUPTHER

## 2020-09-21 ENCOUNTER — IMMUNIZATION (OUTPATIENT)
Dept: OCCUPATIONAL MEDICINE | Facility: CLINIC | Age: 25
End: 2020-09-21

## 2020-09-21 DIAGNOSIS — Z23 NEED FOR VACCINATION: ICD-10-CM

## 2020-09-21 PROCEDURE — 90686 IIV4 VACC NO PRSV 0.5 ML IM: CPT | Performed by: PREVENTIVE MEDICINE

## 2020-09-23 DIAGNOSIS — E66.01 MORBID OBESITY WITH BMI OF 40.0-44.9, ADULT (HCC): ICD-10-CM

## 2020-09-24 RX ORDER — PHENTERMINE HYDROCHLORIDE 37.5 MG/1
TABLET ORAL
Qty: 15 TAB | Refills: 0 | Status: SHIPPED | OUTPATIENT
Start: 2020-09-24 | End: 2020-10-23

## 2020-09-24 NOTE — TELEPHONE ENCOUNTER
Received request via: Pharmacy    Was the patient seen in the last year in this department? Yes     [facility]     8/17/20  [facility]     10/5/20    Does the patient have an active prescription (recently filled or refills available) for medication(s) requested? yes

## 2020-09-28 ENCOUNTER — HOSPITAL ENCOUNTER (OUTPATIENT)
Facility: MEDICAL CENTER | Age: 25
End: 2020-09-28
Attending: PHYSICIAN ASSISTANT
Payer: COMMERCIAL

## 2020-09-28 ENCOUNTER — PATIENT MESSAGE (OUTPATIENT)
Dept: MEDICAL GROUP | Age: 25
End: 2020-09-28

## 2020-09-28 ENCOUNTER — OFFICE VISIT (OUTPATIENT)
Dept: MEDICAL GROUP | Age: 25
End: 2020-09-28
Payer: COMMERCIAL

## 2020-09-28 VITALS
BODY MASS INDEX: 39.02 KG/M2 | SYSTOLIC BLOOD PRESSURE: 110 MMHG | OXYGEN SATURATION: 94 % | WEIGHT: 220.24 LBS | TEMPERATURE: 97.1 F | HEIGHT: 63 IN | HEART RATE: 111 BPM | DIASTOLIC BLOOD PRESSURE: 72 MMHG

## 2020-09-28 DIAGNOSIS — N89.8 VAGINAL PRURITUS: ICD-10-CM

## 2020-09-28 LAB
CANDIDA DNA VAG QL PROBE+SIG AMP: NEGATIVE
G VAGINALIS DNA VAG QL PROBE+SIG AMP: NEGATIVE
T VAGINALIS DNA VAG QL PROBE+SIG AMP: NEGATIVE

## 2020-09-28 PROCEDURE — 87660 TRICHOMONAS VAGIN DIR PROBE: CPT

## 2020-09-28 PROCEDURE — 87510 GARDNER VAG DNA DIR PROBE: CPT

## 2020-09-28 PROCEDURE — 87480 CANDIDA DNA DIR PROBE: CPT

## 2020-09-28 PROCEDURE — 99213 OFFICE O/P EST LOW 20 MIN: CPT | Performed by: PHYSICIAN ASSISTANT

## 2020-09-28 RX ORDER — FLUCONAZOLE 150 MG/1
TABLET ORAL
COMMUNITY
Start: 2020-09-23 | End: 2020-10-26

## 2020-09-28 ASSESSMENT — PATIENT HEALTH QUESTIONNAIRE - PHQ9
9. THOUGHTS THAT YOU WOULD BE BETTER OFF DEAD, OR OF HURTING YOURSELF: 0
4. FEELING TIRED OR HAVING LITTLE ENERGY: 1
1. LITTLE INTEREST OR PLEASURE IN DOING THINGS: 0
6. FEELING BAD ABOUT YOURSELF - OR THAT YOU ARE A FAILURE OR HAVE LET YOURSELF OR YOUR FAMILY DOWN: 0
SUM OF ALL RESPONSES TO PHQ QUESTIONS 1-9: 3
7. TROUBLE CONCENTRATING ON THINGS, SUCH AS READING THE NEWSPAPER OR WATCHING TELEVISION: 0
SUM OF ALL RESPONSES TO PHQ9 QUESTIONS 1 AND 2: 0
3. TROUBLE FALLING OR STAYING ASLEEP OR SLEEPING TOO MUCH: 1
8. MOVING OR SPEAKING SO SLOWLY THAT OTHER PEOPLE COULD HAVE NOTICED. OR THE OPPOSITE, BEING SO FIGETY OR RESTLESS THAT YOU HAVE BEEN MOVING AROUND A LOT MORE THAN USUAL: 0
2. FEELING DOWN, DEPRESSED, IRRITABLE, OR HOPELESS: 0
5. POOR APPETITE OR OVEREATING: 1

## 2020-09-28 NOTE — PROGRESS NOTES
"  Subjective:     Chief Complaint   Patient presents with   • Vaginal Itching     x 1.5 weeks, Was on hger period last week.     Danyelle Arevalo is a 25 y.o. female here today for evaluation and management of:    Vaginal pruritus  New problem.   Reports vaginal pruritus 1.5 weeks. Was worse at start than it is now. Telemedicine appointment on Thursday of last week and prescribed diflucan. Hasn't taken yet.   Period ended yesterday. Tampon on first day and was very uncomfortable due to itchiness. States it was \"swollen\" and uncomfortable.   Reports she bought monistat at mom's advice but didn't use.   Reports urinating to help relieve pruritus as it gave excuse to wipe.   Denies fever, dysuria, vaginal discharge, pelvic pain or abdominal pain.    ROS   Denies any recent fevers or chills. No nausea or vomiting. No diarrhea. No chest pains or shortness of breath. No lower extremity edema.    Allergies   Allergen Reactions   • Latex      When apply will cause rash       Current medicines (including changes today)  Current Outpatient Medications   Medication Sig Dispense Refill   • phentermine (ADIPEX-P) 37.5 MG tablet TAKE 1/2 TABLET ORALLYEVERY MORNING BEFORE BREAKFAST FOR 30 DAYS E66.01 15 Tab 0   • buPROPion (WELLBUTRIN XL) 150 MG XL tablet TAKE 1 TABLET BY MOUTH EVERY DAY IN THE MORNING 90 Tab 3   • topiramate (TOPAMAX) 25 MG Tab Take 1 Tab by mouth every day. Take am dose with phentermine. 30 Tab 1   • norethindrone-ethinyl estradiol-iron (BLISOVI FE 1.5/30) 1.5-30 MG-MCG tablet Take 1 Tab by mouth every day. 84 Tab 11   • ondansetron (ZOFRAN) 4 MG Tab tablet Take 1 Tab by mouth every four hours as needed for Nausea/Vomiting. 10 Tab 0   • fluconazole (DIFLUCAN) 150 MG tablet        No current facility-administered medications for this visit.        Patient Active Problem List    Diagnosis Date Noted   • Major depressive disorder 07/27/2020   • Hypercholesterolemia 01/30/2020   • Weight gain 01/30/2020   • " "Chronic fatigue 01/30/2020   • Binge eating 01/30/2020   • Gastroesophageal reflux disease without esophagitis 01/30/2020   • Morbid obesity with BMI of 40.0-44.9, adult ([facility]) 11/28/2016   • DUB (dysfunctional uterine bleeding) 02/01/2015   • H/O iron deficiency secondary to DUB 02/01/2015   • Tension headache 12/12/2014       Family History   Problem Relation Age of Onset   • Hypertension Maternal Grandmother    • Cancer Maternal Grandfather 62        colon   • Diabetes Maternal Grandfather    • Hyperlipidemia Father    • Hyperlipidemia Maternal Uncle    • Hyperlipidemia Paternal Aunt    • Hyperlipidemia Paternal Aunt    • Hyperlipidemia Maternal Uncle    • Cancer Mother 47        Breast   • Cancer Paternal Grandmother         breast          Objective:     Vitals:    09/28/20 0732   BP: 110/72   BP Location: Left arm   Patient Position: Sitting   BP Cuff Size: Adult   Pulse: (!) 111   Temp: 36.2 °C (97.1 °F)   TempSrc: Temporal   SpO2: 94%   Weight: 99.9 kg (220 lb 3.8 oz)   Height: 1.6 m (5' 3\")     Body mass index is 39.01 kg/m².     Physical Exam:  Constitutional: Alert, no distress, well-groomed.  Skin: Warm, dry, good turgor, no rashes in visible areas.  Eye: Equal, round and reactive, conjunctiva clear, lids normal.  Neck: Trachea midline, no masses, no thyromegaly.  Cardiovascular: Regular rate and rhythm.  Chest: Effort normal. Clear to auscultation throughout. No adventitious sounds.   Abdomen: Soft, no gross masses.No CVAT  Declines pelvic exam.  MSK: Normal gait, moves all extremities.  Neuro: Grossly non-focal. No cranial nerve deficit. Strength and sensation intact.   Psych: Alert and oriented x3, normal affect and mood.      Assessment and Plan:   The following treatment plan was discussed:     1. Vaginal pruritus  Likely yeast. Pt requested to self swab and declines exam. If positive for yeast, will start diflucan or monistat as she has both on hand.   If negative will need to perform pelvic " exam.  -Any change or worsening of signs or symptoms, patient encouraged to follow-up or report to emergency room for further evaluation. Patient verbalizes understanding and agrees.  - VAGINAL PATHOGENS DNA PANEL; Future    Other orders  - fluconazole (DIFLUCAN) 150 MG tablet    Followup: Return if symptoms worsen or fail to improve.

## 2020-10-25 NOTE — PROGRESS NOTES
Subjective:   No chief complaint on file.    Danyelle Arevalo is a 25 y.o. female here today for evaluation and management of:    Vaginal pruritus  ***    Moderate episode of recurrent major depressive disorder (HCC)  Stable. Currently taking *** as prescribed.   Denies side effects and is tolerating well.  Mood is improved with current medication and therapy.    Patient denies SI/HI.  Depression Screen (PHQ-2/PHQ-9) 8/17/2020 8/31/2020 9/28/2020   PHQ-2 Total Score - 0 0   PHQ-2 Total Score - - -   PHQ-2 Total Score 0 - -   PHQ-9 Total Score - 3 3   PHQ-9 Total Score - - -             ROS ***  Denies any recent fevers or chills. No nausea or vomiting. No diarrhea. No chest pains or shortness of breath. No lower extremity edema.    Allergies   Allergen Reactions   • Latex      When apply will cause rash       Current medicines (including changes today)  Current Outpatient Medications   Medication Sig Dispense Refill   • fluconazole (DIFLUCAN) 150 MG tablet      • buPROPion (WELLBUTRIN XL) 150 MG XL tablet TAKE 1 TABLET BY MOUTH EVERY DAY IN THE MORNING 90 Tab 3   • topiramate (TOPAMAX) 25 MG Tab Take 1 Tab by mouth every day. Take am dose with phentermine. 30 Tab 1   • norethindrone-ethinyl estradiol-iron (BLISOVI FE 1.5/30) 1.5-30 MG-MCG tablet Take 1 Tab by mouth every day. 84 Tab 11   • ondansetron (ZOFRAN) 4 MG Tab tablet Take 1 Tab by mouth every four hours as needed for Nausea/Vomiting. 10 Tab 0     No current facility-administered medications for this visit.        Patient Active Problem List    Diagnosis Date Noted   • Major depressive disorder 07/27/2020   • Hypercholesterolemia 01/30/2020   • Weight gain 01/30/2020   • Chronic fatigue 01/30/2020   • Binge eating 01/30/2020   • Gastroesophageal reflux disease without esophagitis 01/30/2020   • Morbid obesity with BMI of 40.0-44.9, adult ([facility]) 11/28/2016   • DUB (dysfunctional uterine bleeding) 02/01/2015   • H/O iron deficiency secondary to DUB  02/01/2015   • Tension headache 12/12/2014       Family History   Problem Relation Age of Onset   • Hypertension Maternal Grandmother    • Cancer Maternal Grandfather 62        colon   • Diabetes Maternal Grandfather    • Hyperlipidemia Father    • Hyperlipidemia Maternal Uncle    • Hyperlipidemia Paternal Aunt    • Hyperlipidemia Paternal Aunt    • Hyperlipidemia Maternal Uncle    • Cancer Mother 47        Breast   • Cancer Paternal Grandmother         breast          Objective:     There were no vitals filed for this visit.  There is no height or weight on file to calculate BMI. ***    Physical Exam:  Gen: Well developed, well nourished in no acute distress.   Skin: Pink, warm, and dry  HEENT: conjunctiva non-injected, sclera non-icteric. EOMs intact.   Nasal mucosa without edema nor erythema. No facial tenderness  Pinna normal. TM pearly gray.   Oral mucous membranes pink and moist with no lesions.  Neck: Supple, trachea midline. No adenopathy or masses in the neck or supraclavicular regions.  Lungs: Effort is normal. Clear to auscultation bilaterally with good excursion.  CV: regular rate and rhythm.  Abdomen: soft, nontender, + BS. No HSM.  No CVAT  Ext: no edema, color normal, vascularity normal, temperature normal  Alert and oriented Eye contact is good, speech goal directed, affect calm    ***    Assessment and Plan:   The following treatment plan was discussed: ***    1. Vaginal pruritus    2. Moderate episode of recurrent major depressive disorder (HCC)      -Any change or worsening of signs or symptoms, patient encouraged to follow-up or report to emergency room for further evaluation. Patient verbalizes understanding and agrees.    Health Maintenance: ***    Followup: No follow-ups on file.         This dictation was created using voice recognition software. The accuracy of the dictation is limited to the abilities of the software. I expect there may be some errors of grammar and possibly content.

## 2020-10-26 ENCOUNTER — TELEMEDICINE (OUTPATIENT)
Dept: MEDICAL GROUP | Age: 25
End: 2020-10-26

## 2020-10-26 ENCOUNTER — APPOINTMENT (OUTPATIENT)
Dept: MEDICAL GROUP | Age: 25
End: 2020-10-26
Payer: COMMERCIAL

## 2020-10-26 VITALS — BODY MASS INDEX: 40.28 KG/M2 | HEIGHT: 62 IN

## 2020-10-26 DIAGNOSIS — N89.8 VAGINAL PRURITUS: ICD-10-CM

## 2020-10-26 DIAGNOSIS — M54.50 ACUTE RIGHT-SIDED LOW BACK PAIN WITHOUT SCIATICA: ICD-10-CM

## 2020-10-26 DIAGNOSIS — R82.90 CLOUDY URINE: ICD-10-CM

## 2020-10-26 PROCEDURE — 99214 OFFICE O/P EST MOD 30 MIN: CPT | Mod: 95,CR | Performed by: PHYSICIAN ASSISTANT

## 2020-10-26 SDOH — HEALTH STABILITY: MENTAL HEALTH: HOW OFTEN DO YOU HAVE A DRINK CONTAINING ALCOHOL?: MONTHLY OR LESS

## 2020-10-26 NOTE — PROGRESS NOTES
"Virtual Visit: Established Patient   This visit was conducted via Zoom using secure and encrypted videoconferencing technology. The patient was in a private location in the state of [place].    The patient's identity was confirmed and verbal consent was obtained for this virtual visit.    Subjective:   CC:   Chief Complaint   Patient presents with   • Pruritis     Came back 2 weeks ago.   • Back Pain     Right lower back 2weeks ago.       Danyelle Arevalo is a 25 y.o. female presenting for evaluation and management of:    Vaginal pruritus  Use monistat and it relieved the symptoms for about 1 week. Then feels like it came back minimally.   Just had period and ended yesterday and itching was \"severe.\"  Usually uses tampon but this time only did first and last day. Has not changed brands. Uses non fragrant. Leaves in for 8+  Hours.  OCP unchanged since started.  Seems to be correlated to menses. Unable to come in for pelvic exam today due to household contacts testing for COVID.    Back Pain/Cloudy Urine  New problem. Started two weeks ago.  Thought maybe a muscle ache secondary to moving.   States pain is deep and right side near CVA.  5/10- annoying and constant. Ache in nature. \"definintely not sharp.\"   Rubbing back and stretching (only lateral flexion) isn't helping at all. Salon Pas didn't help at all.  Nothing makes it better but nothing seems to make it worse.   Pain is \"lower now than it was last week.\"  She denies numbness, tingling, or weakness in her legs. She denies saddle paresthesias or loss of urinary/bowel control.   Last week was \"super tender to the touch\" but unsure if related to massaging it a lot and skin sensitive.   Pain is unchanged since onset.    Noticed urine was cloudy today. No pain with urination. Denies urinary frequency. No vaginal discharge. Some pelvic pruritus. No blood noted in urine.   Reports some tenderness at CVA.    ROS   Denies any recent fevers or chills. No nausea or " vomiting. No chest pains or shortness of breath.     Allergies   Allergen Reactions   • Latex      When apply will cause rash       Current medicines (including changes today)  Current Outpatient Medications   Medication Sig Dispense Refill   • buPROPion (WELLBUTRIN XL) 150 MG XL tablet TAKE 1 TABLET BY MOUTH EVERY DAY IN THE MORNING 90 Tab 3   • topiramate (TOPAMAX) 25 MG Tab Take 1 Tab by mouth every day. Take am dose with phentermine. 30 Tab 1   • norethindrone-ethinyl estradiol-iron (BLISOVI FE 1.5/30) 1.5-30 MG-MCG tablet Take 1 Tab by mouth every day. 84 Tab 11   • ondansetron (ZOFRAN) 4 MG Tab tablet Take 1 Tab by mouth every four hours as needed for Nausea/Vomiting. 10 Tab 0     No current facility-administered medications for this visit.        Patient Active Problem List    Diagnosis Date Noted   • Major depressive disorder 07/27/2020   • Hypercholesterolemia 01/30/2020   • Weight gain 01/30/2020   • Chronic fatigue 01/30/2020   • Binge eating 01/30/2020   • Gastroesophageal reflux disease without esophagitis 01/30/2020   • Morbid obesity with BMI of 40.0-44.9, adult ([facility]) 11/28/2016   • DUB (dysfunctional uterine bleeding) 02/01/2015   • H/O iron deficiency secondary to DUB 02/01/2015   • Tension headache 12/12/2014       Family History   Problem Relation Age of Onset   • Hypertension Maternal Grandmother    • Cancer Maternal Grandfather 62        colon   • Diabetes Maternal Grandfather    • Hyperlipidemia Father    • Hyperlipidemia Maternal Uncle    • Hyperlipidemia Paternal Aunt    • Hyperlipidemia Paternal Aunt    • Hyperlipidemia Maternal Uncle    • Cancer Mother 47        Breast   • Cancer Paternal Grandmother         breast       She  has a past medical history of DUB (dysfunctional uterine bleeding) (2/1/2015), H/O iron deficiency secondary to DUB (2/1/2015), Major depression (12/12/2014), and Tension headache (12/12/2014).  She  has a past surgical history that includes appendectomy (2007).        "Objective:   Ht 1.575 m (5' 2\") Comment: pt. reported  BMI 40.28 kg/m²     Physical Exam:  Constitutional: Alert, no distress, well-groomed.  Skin: No rashes in visible areas.  Eye: Round. Conjunctiva clear, lids normal. No icterus.   ENMT: Lips pink without lesions, good dentition, moist mucous membranes. Phonation normal.  Neck: No masses, no thyromegaly. Moves freely without pain.  Back: ROM full in all directions. Patient hit herself at CVA and reports minimal tenderness.  Respiratory: Unlabored respiratory effort, no cough or audible wheeze  Psych: Alert and oriented x3, normal affect and mood.       Assessment and Plan:   The following treatment plan was discussed:     1. Vaginal pruritus  Will try another round of monistat. Try changing tampon brand. Change more frequently. Increase probiotics. If no improvement or return of symptoms following next cycle, she will schedule pelvic exam. Sooner if worsening.    2. Acute right-sided low back pain without sciatica  - Stretching exercises at least once daily.   - Obtain UA.    3. Cloudy urine  Will work on getting urine sample once her family members COVID testing comes back. If not, may need to consider urgent care visit.  - URINALYSIS,CULTURE IF INDICATED; Future    Follow-up: Return in about 1 month (around 11/26/2020) for if symptoms return, sooner if needed or no improvement.         "

## 2020-11-03 ENCOUNTER — EH NON-PROVIDER (OUTPATIENT)
Dept: OCCUPATIONAL MEDICINE | Facility: CLINIC | Age: 25
End: 2020-11-03

## 2020-11-03 ENCOUNTER — HOSPITAL ENCOUNTER (OUTPATIENT)
Facility: MEDICAL CENTER | Age: 25
End: 2020-11-03
Attending: NURSE PRACTITIONER
Payer: COMMERCIAL

## 2020-11-03 DIAGNOSIS — Z11.59 ENCOUNTER FOR SCREENING FOR OTHER VIRAL DISEASES: ICD-10-CM

## 2020-11-03 PROCEDURE — U0003 INFECTIOUS AGENT DETECTION BY NUCLEIC ACID (DNA OR RNA); SEVERE ACUTE RESPIRATORY SYNDROME CORONAVIRUS 2 (SARS-COV-2) (CORONAVIRUS DISEASE [COVID-19]), AMPLIFIED PROBE TECHNIQUE, MAKING USE OF HIGH THROUGHPUT TECHNOLOGIES AS DESCRIBED BY CMS-2020-01-R: HCPCS | Mod: CS | Performed by: PREVENTIVE MEDICINE

## 2020-11-04 DIAGNOSIS — Z11.59 ENCOUNTER FOR SCREENING FOR OTHER VIRAL DISEASES: ICD-10-CM

## 2020-11-04 LAB — COVID ORDER STATUS COVID19: NORMAL

## 2020-11-05 ENCOUNTER — TELEPHONE (OUTPATIENT)
Dept: OCCUPATIONAL MEDICINE | Facility: CLINIC | Age: 25
End: 2020-11-05

## 2020-11-05 LAB
SARS-COV-2 RNA RESP QL NAA+PROBE: DETECTED
SPECIMEN SOURCE: ABNORMAL

## 2021-01-05 ENCOUNTER — OFFICE VISIT (OUTPATIENT)
Dept: MEDICAL GROUP | Age: 26
End: 2021-01-05
Payer: COMMERCIAL

## 2021-01-05 VITALS
BODY MASS INDEX: 40.22 KG/M2 | TEMPERATURE: 97.9 F | SYSTOLIC BLOOD PRESSURE: 118 MMHG | WEIGHT: 227 LBS | OXYGEN SATURATION: 97 % | HEART RATE: 103 BPM | HEIGHT: 63 IN | DIASTOLIC BLOOD PRESSURE: 70 MMHG

## 2021-01-05 DIAGNOSIS — G89.29 CHRONIC MIDLINE LOW BACK PAIN WITHOUT SCIATICA: ICD-10-CM

## 2021-01-05 DIAGNOSIS — M54.50 CHRONIC MIDLINE LOW BACK PAIN WITHOUT SCIATICA: ICD-10-CM

## 2021-01-05 PROCEDURE — 99213 OFFICE O/P EST LOW 20 MIN: CPT | Performed by: PHYSICIAN ASSISTANT

## 2021-01-05 ASSESSMENT — PATIENT HEALTH QUESTIONNAIRE - PHQ9
SUM OF ALL RESPONSES TO PHQ QUESTIONS 1-9: 3
5. POOR APPETITE OR OVEREATING: 0 - NOT AT ALL
CLINICAL INTERPRETATION OF PHQ2 SCORE: 1

## 2021-01-05 ASSESSMENT — ANXIETY QUESTIONNAIRES
4. TROUBLE RELAXING: SEVERAL DAYS
GAD7 TOTAL SCORE: 12
2. NOT BEING ABLE TO STOP OR CONTROL WORRYING: NEARLY EVERY DAY
5. BEING SO RESTLESS THAT IT IS HARD TO SIT STILL: NOT AT ALL
6. BECOMING EASILY ANNOYED OR IRRITABLE: NEARLY EVERY DAY
7. FEELING AFRAID AS IF SOMETHING AWFUL MIGHT HAPPEN: SEVERAL DAYS
3. WORRYING TOO MUCH ABOUT DIFFERENT THINGS: MORE THAN HALF THE DAYS
1. FEELING NERVOUS, ANXIOUS, OR ON EDGE: MORE THAN HALF THE DAYS

## 2021-01-05 NOTE — PATIENT INSTRUCTIONS
[email or web address]

## 2021-01-05 NOTE — PROGRESS NOTES
Subjective:     Chief Complaint   Patient presents with   • Back Pain     near spine x's 3mnth 1/10 pain     Danyelle Arevalo is a 25 y.o. female here today for evaluation and management of:    Chronic midline low back pain without sciatica  October pain started. Has moved from sides now to midline.  Sometimes when massages spine area feels it radiates.   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 No anti-fatigue mats. Sore when she gets off.  Stretching maybe once or twice a week. Not daily.    Allergies   Allergen Reactions   • Latex      When apply will cause rash       Current medicines (including changes today)  Current Outpatient Medications   Medication Sig Dispense Refill   • buPROPion (WELLBUTRIN XL) 150 MG XL tablet TAKE 1 TABLET BY MOUTH EVERY DAY IN THE MORNING 90 Tab 3   • norethindrone-ethinyl estradiol-iron (BLISOVI FE 1.5/30) 1.5-30 MG-MCG tablet Take 1 Tab by mouth every day. 84 Tab 11   • ondansetron (ZOFRAN) 4 MG Tab tablet Take 1 Tab by mouth every four hours as needed for Nausea/Vomiting. 10 Tab 0     No current facility-administered medications for this visit.        Patient Active Problem List    Diagnosis Date Noted   • Major depressive disorder 07/27/2020   • Hypercholesterolemia 01/30/2020   • Weight gain 01/30/2020   • Chronic fatigue 01/30/2020   • Binge eating 01/30/2020   • Gastroesophageal reflux disease without esophagitis 01/30/2020   • Morbid obesity with BMI of 40.0-44.9, adult ([facility]) 11/28/2016   • DUB (dysfunctional uterine bleeding) 02/01/2015   • H/O iron deficiency secondary to DUB 02/01/2015   • Tension headache 12/12/2014       Family History   Problem Relation Age of Onset   • Hypertension Maternal Grandmother    • Cancer Maternal Grandfather 62        colon   • Diabetes Maternal Grandfather    • Hyperlipidemia Father    •  "Hyperlipidemia Maternal Uncle    • Hyperlipidemia Paternal Aunt    • Hyperlipidemia Paternal Aunt    • Hyperlipidemia Maternal Uncle    • Cancer Mother 47        Breast   • Cancer Paternal Grandmother         breast          Objective:     Vitals:    01/05/21 1103   BP: 118/70   BP Location: Right arm   Patient Position: Sitting   BP Cuff Size: Adult   Pulse: (!) 103   Temp: 36.6 °C (97.9 °F)   TempSrc: Temporal   SpO2: 97%   Weight: 103 kg (227 lb)   Height: 1.6 m (5' 3\")     Body mass index is 40.21 kg/m².     Physical Exam:  Constitutional: Alert, no distress, well-groomed.  Skin: Warm, dry, good turgor, no rashes in visible areas.  Eye: Equal, round and reactive, conjunctiva clear, lids normal.  Neck: Trachea midline, no masses, no thyromegaly.  Cardiovascular: Regular rate and rhythm.  Chest: Effort normal. Clear to auscultation throughout. No adventitious sounds.   Back: SLT negative.Mild tenderness in paraspinous muscles lumbar spine without current spasm. No spinous process tenderness.  No pain with stress of SI. Full hip ROM. Poor hamstring flexibility. No sx with axial loading. No significant spinal curvature on forward bend   Neuro: Grossly non-focal. No cranial nerve deficit. Strength and sensation intact.   Psych: Alert and oriented x3, normal affect and mood.      Assessment and Plan:   The following treatment plan was discussed:     1. Chronic midline low back pain without sciatica  no improvement, but was not doing stretching as previously discussed.   Reassurance provided.  - Performed and demonstrated stretches in office with patient. Will do at minimum BID.  NSAIDs daily x 1 week then PRN thereafter.  No improvement will refer to physical therapy.   - Discussed importance of ice x 20 minutes when sore.  - pt provided with additional education material on AVS  -Any change or worsening of signs or symptoms, patient encouraged to follow-up or report to emergency room for further evaluation. Patient " verbalizes understanding and agrees.    Followup: Return in about 1 month (around 2/5/2021) for follow-up on pain if not otherwise improved/resolved, sooner if needed.

## 2021-01-12 ENCOUNTER — PATIENT MESSAGE (OUTPATIENT)
Dept: MEDICAL GROUP | Age: 26
End: 2021-01-12

## 2021-01-12 DIAGNOSIS — N93.8 DUB (DYSFUNCTIONAL UTERINE BLEEDING): ICD-10-CM

## 2021-01-12 DIAGNOSIS — F33.1 MODERATE EPISODE OF RECURRENT MAJOR DEPRESSIVE DISORDER (HCC): ICD-10-CM

## 2021-01-12 RX ORDER — NORETHINDRONE ACETATE AND ETHINYL ESTRADIOL 1.5-30(21)
1 KIT ORAL DAILY
Qty: 84 TAB | Refills: 11 | Status: SHIPPED | OUTPATIENT
Start: 2021-01-12 | End: 2022-03-08

## 2021-01-12 RX ORDER — BUPROPION HYDROCHLORIDE 150 MG/1
150 TABLET ORAL EVERY MORNING
Qty: 90 TAB | Refills: 3 | Status: SHIPPED | OUTPATIENT
Start: 2021-01-12 | End: 2021-03-09 | Stop reason: SDUPTHER

## 2021-01-13 NOTE — PATIENT COMMUNICATION
Received request via: Patient    Was the patient seen in the last year in this department? Yes    Does the patient have an active prescription (recently filled or refills available) for medication(s) requested? PT CHANGED INSURANCE AND IS REQUESTING HER MEDICATIONS GO TO [facility] FOR INSURANCE TO COVER THEM PLEASE ADVISE.

## 2021-02-26 ENCOUNTER — APPOINTMENT (OUTPATIENT)
Dept: DERMATOLOGY | Facility: IMAGING CENTER | Age: 26
End: 2021-02-26
Payer: COMMERCIAL

## 2021-03-09 ENCOUNTER — TELEMEDICINE (OUTPATIENT)
Dept: MEDICAL GROUP | Age: 26
End: 2021-03-09
Payer: COMMERCIAL

## 2021-03-09 VITALS — HEIGHT: 63 IN | WEIGHT: 226 LBS | BODY MASS INDEX: 40.04 KG/M2

## 2021-03-09 DIAGNOSIS — R53.82 CHRONIC FATIGUE: ICD-10-CM

## 2021-03-09 DIAGNOSIS — R68.89 COLD INTOLERANCE: ICD-10-CM

## 2021-03-09 DIAGNOSIS — F33.1 MODERATE EPISODE OF RECURRENT MAJOR DEPRESSIVE DISORDER (HCC): ICD-10-CM

## 2021-03-09 DIAGNOSIS — E78.00 HYPERCHOLESTEROLEMIA: ICD-10-CM

## 2021-03-09 DIAGNOSIS — Z86.39 H/O IRON DEFICIENCY: ICD-10-CM

## 2021-03-09 PROCEDURE — 99214 OFFICE O/P EST MOD 30 MIN: CPT | Mod: 95,CR | Performed by: PHYSICIAN ASSISTANT

## 2021-03-09 RX ORDER — BUTALBITAL, ACETAMINOPHEN AND CAFFEINE 50; 325; 40 MG/1; MG/1; MG/1
1 TABLET ORAL EVERY 4 HOURS PRN
COMMUNITY
End: 2023-02-06 | Stop reason: SDUPTHER

## 2021-03-09 RX ORDER — BUPROPION HYDROCHLORIDE 300 MG/1
300 TABLET ORAL EVERY MORNING
Qty: 90 TABLET | Refills: 1 | Status: SHIPPED | OUTPATIENT
Start: 2021-03-09 | End: 2021-09-20

## 2021-03-09 NOTE — PROGRESS NOTES
Virtual Visit: Established Patient   This visit was conducted via Zoom using secure and encrypted videoconferencing technology. The patient was in a private location in the state of [place].    The patient's identity was confirmed and verbal consent was obtained for this virtual visit.    Subjective:   CC:   Chief Complaint   Patient presents with   • Fatigue     cold X 3 months        Danyelle Arevalo is a 26 y.o. female presenting for evaluation and management of:    Patient reports she is living at her mom's house-- reports she is very tired and cold.   Mom thinks something is wrong with her.  Bowel movements are normal. Menses regulated by OCP but unchanged.  Hair is normal. Skin is not dry or oily-- normal.  Mom's house set at 68. She always has a heater on as well blanket.     Reports she is not exercising. Only eating once daily (not very hungry)-- Reports well rounded meal. No snacks. Drinking lots of water.     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 Reports she is doing really well in school.     Denies any recent fevers or chills. Reports occasional night sweats   No nausea or vomiting. No chest pains or shortness of breath.     Allergies   Allergen Reactions   • Latex      When apply will cause rash       Current medicines (including changes today)  Current Outpatient Medications   Medication Sig Dispense Refill   • butalbital/apap/caffeine -40 mg (FIORICET) -40 MG Tab Take 1 tablet by mouth every four hours as needed for Headache.     • buPROPion (WELLBUTRIN XL) 300 MG XL tablet Take 1 tablet by mouth every morning. 90 tablet 1   • norethindrone-ethinyl estradiol-iron (BLISOVI FE 1.5/30) 1.5-30 MG-MCG tablet Take 1 Tab by mouth every day. 84 Tab 11   • ondansetron (ZOFRAN) 4 MG Tab tablet Take 1 Tab by mouth every four hours as needed for Nausea/Vomiting. 10 Tab 0     No current facility-administered medications for this visit.       Patient Active Problem List     "Diagnosis Date Noted   • Major depressive disorder 07/27/2020   • Hypercholesterolemia 01/30/2020   • Weight gain 01/30/2020   • Chronic fatigue 01/30/2020   • Binge eating 01/30/2020   • Gastroesophageal reflux disease without esophagitis 01/30/2020   • Morbid obesity with BMI of 40.0-44.9, adult ([facility]) 11/28/2016   • DUB (dysfunctional uterine bleeding) 02/01/2015   • H/O iron deficiency secondary to DUB 02/01/2015   • Tension headache 12/12/2014        Objective:   Ht 1.6 m (5' 3\") Comment: pt. stated  Wt 103 kg (226 lb) Comment: pt. stated  BMI 40.03 kg/m²     Physical Exam:  Constitutional: Alert, no distress, well-groomed.  Skin: No rashes in visible areas.  Eye: Round. Conjunctiva clear, lids normal. No icterus.   ENMT: Lips pink without lesions, good dentition, moist mucous membranes. Phonation normal.  Neck: No masses, no thyromegaly. Moves freely without pain.  Respiratory: Unlabored respiratory effort, no cough or audible wheeze  Psych: Alert and oriented x3, normal affect and mood.       Assessment and Plan:   The following treatment plan was discussed:     1. Chronic fatigue   will assess with new labs. Seems to be related to working and being in school. School is virtual so more sitting. TSH last checked 05/21/2018 2.930; CBC with hemoglobin of 14.8 at that time too.  - CBC WITH DIFFERENTIAL; Future  - TSH WITH REFLEX TO FT4; Future    2. H/O iron deficiency secondary to DUB  Chronic, controlled with OCPs treating DUB. Will reassess as she is symptomatic.  - CBC WITH DIFFERENTIAL; Future  - IRON/TOTAL IRON BIND; Future  - FERRITIN; Future    3. Hypercholesterolemia  Chronic, managed through lifestyle modifications. Due for labs.   - Comp Metabolic Panel; Future  - Lipid Profile; Future    4. Cold intolerance  See #1. Will order labs, likely due to lack of movement as she is often sitting and studying. No longer cold if up and moving around--reassurance provided.  - TSH WITH REFLEX TO FT4; Future    5. " Moderate episode of recurrent major depressive disorder (HCC)  Chronic, feels like she could use more relief of symptoms. Increase Wellbutrin from 150 mg Xl to 300 mg XL. Follow-up in 4-6 weeks. Sooner if needed.  - buPROPion (WELLBUTRIN XL) 300 MG XL tablet; Take 1 tablet by mouth every morning.  Dispense: 90 tablet; Refill: 1      Follow-up: Return in about 5 weeks (around 4/13/2021) for follow-up on Wellbutrin, sooner if needed.

## 2021-04-07 LAB
ALBUMIN SERPL-MCNC: 4 G/DL (ref 3.9–5)
ALBUMIN/GLOB SERPL: 1.3 {RATIO} (ref 1.2–2.2)
ALP SERPL-CCNC: 73 IU/L (ref 39–117)
ALT SERPL-CCNC: 13 IU/L (ref 0–32)
AST SERPL-CCNC: 15 IU/L (ref 0–40)
BASOPHILS # BLD AUTO: 0 X10E3/UL (ref 0–0.2)
BASOPHILS NFR BLD AUTO: 0 %
BILIRUB SERPL-MCNC: 0.5 MG/DL (ref 0–1.2)
BUN SERPL-MCNC: 11 MG/DL (ref 6–20)
BUN/CREAT SERPL: 12 (ref 9–23)
CALCIUM SERPL-MCNC: 9.8 MG/DL (ref 8.7–10.2)
CHLORIDE SERPL-SCNC: 99 MMOL/L (ref 96–106)
CHOLEST SERPL-MCNC: 217 MG/DL (ref 100–199)
CO2 SERPL-SCNC: 22 MMOL/L (ref 20–29)
CREAT SERPL-MCNC: 0.95 MG/DL (ref 0.57–1)
EOSINOPHIL # BLD AUTO: 0.1 X10E3/UL (ref 0–0.4)
EOSINOPHIL NFR BLD AUTO: 1 %
ERYTHROCYTE [DISTWIDTH] IN BLOOD BY AUTOMATED COUNT: 13 % (ref 11.7–15.4)
FERRITIN SERPL-MCNC: 110 NG/ML (ref 15–150)
GLOBULIN SER CALC-MCNC: 3 G/DL (ref 1.5–4.5)
GLUCOSE SERPL-MCNC: 89 MG/DL (ref 65–99)
HCT VFR BLD AUTO: 44.5 % (ref 34–46.6)
HDLC SERPL-MCNC: 69 MG/DL
HGB BLD-MCNC: 14.7 G/DL (ref 11.1–15.9)
IMM GRANULOCYTES # BLD AUTO: 0 X10E3/UL (ref 0–0.1)
IMM GRANULOCYTES NFR BLD AUTO: 0 %
IMMATURE CELLS  115398: NORMAL
IRON SATN MFR SERPL: 37 % (ref 15–55)
IRON SERPL-MCNC: 156 UG/DL (ref 27–159)
LABORATORY COMMENT REPORT: ABNORMAL
LDLC SERPL CALC-MCNC: 127 MG/DL (ref 0–99)
LYMPHOCYTES # BLD AUTO: 2.7 X10E3/UL (ref 0.7–3.1)
LYMPHOCYTES NFR BLD AUTO: 31 %
MCH RBC QN AUTO: 29.6 PG (ref 26.6–33)
MCHC RBC AUTO-ENTMCNC: 33 G/DL (ref 31.5–35.7)
MCV RBC AUTO: 90 FL (ref 79–97)
MONOCYTES # BLD AUTO: 0.7 X10E3/UL (ref 0.1–0.9)
MONOCYTES NFR BLD AUTO: 8 %
MORPHOLOGY BLD-IMP: NORMAL
NEUTROPHILS # BLD AUTO: 5.2 X10E3/UL (ref 1.4–7)
NEUTROPHILS NFR BLD AUTO: 60 %
NRBC BLD AUTO-RTO: NORMAL %
PLATELET # BLD AUTO: 371 X10E3/UL (ref 150–450)
POTASSIUM SERPL-SCNC: 5.2 MMOL/L (ref 3.5–5.2)
PROT SERPL-MCNC: 7 G/DL (ref 6–8.5)
RBC # BLD AUTO: 4.96 X10E6/UL (ref 3.77–5.28)
SODIUM SERPL-SCNC: 137 MMOL/L (ref 134–144)
TIBC SERPL-MCNC: 422 UG/DL (ref 250–450)
TRIGL SERPL-MCNC: 119 MG/DL (ref 0–149)
TSH SERPL DL<=0.005 MIU/L-ACNC: 2.87 UIU/ML (ref 0.45–4.5)
UIBC SERPL-MCNC: 266 UG/DL (ref 131–425)
VLDLC SERPL CALC-MCNC: 21 MG/DL (ref 5–40)
WBC # BLD AUTO: 8.8 X10E3/UL (ref 3.4–10.8)

## 2021-04-12 ENCOUNTER — TELEMEDICINE (OUTPATIENT)
Dept: MEDICAL GROUP | Age: 26
End: 2021-04-12
Payer: COMMERCIAL

## 2021-04-12 VITALS — BODY MASS INDEX: 40.75 KG/M2 | HEIGHT: 63 IN | WEIGHT: 230 LBS

## 2021-04-12 DIAGNOSIS — E66.01 MORBID OBESITY WITH BMI OF 40.0-44.9, ADULT (HCC): ICD-10-CM

## 2021-04-12 DIAGNOSIS — F41.1 GAD (GENERALIZED ANXIETY DISORDER): ICD-10-CM

## 2021-04-12 DIAGNOSIS — F33.1 MODERATE EPISODE OF RECURRENT MAJOR DEPRESSIVE DISORDER (HCC): ICD-10-CM

## 2021-04-12 PROCEDURE — 99213 OFFICE O/P EST LOW 20 MIN: CPT | Mod: 95,CR | Performed by: PHYSICIAN ASSISTANT

## 2021-04-12 ASSESSMENT — PATIENT HEALTH QUESTIONNAIRE - PHQ9
5. POOR APPETITE OR OVEREATING: 1 - SEVERAL DAYS
SUM OF ALL RESPONSES TO PHQ QUESTIONS 1-9: 4
CLINICAL INTERPRETATION OF PHQ2 SCORE: 1

## 2021-04-12 ASSESSMENT — FIBROSIS 4 INDEX: FIB4 SCORE: 0.27

## 2021-04-12 ASSESSMENT — ANXIETY QUESTIONNAIRES
4. TROUBLE RELAXING: NOT AT ALL
2. NOT BEING ABLE TO STOP OR CONTROL WORRYING: NOT AT ALL
GAD7 TOTAL SCORE: 9
7. FEELING AFRAID AS IF SOMETHING AWFUL MIGHT HAPPEN: NOT AT ALL
1. FEELING NERVOUS, ANXIOUS, OR ON EDGE: MORE THAN HALF THE DAYS
5. BEING SO RESTLESS THAT IT IS HARD TO SIT STILL: SEVERAL DAYS
3. WORRYING TOO MUCH ABOUT DIFFERENT THINGS: NEARLY EVERY DAY
6. BECOMING EASILY ANNOYED OR IRRITABLE: NEARLY EVERY DAY

## 2021-04-12 NOTE — PROGRESS NOTES
Virtual Visit: Established Patient   This visit was conducted via Zoom using secure and encrypted videoconferencing technology. The patient was in a private location in the state [facility].    The patient's identity was confirmed and verbal consent was obtained for this virtual visit.    Subjective:   CC:   Chief Complaint   Patient presents with   • Lab Results   • Depression     Follow-up       Danyelle Arevalo is a 26 y.o. female presenting for evaluation and management of:    Moderate episode of recurrent major depressive disorder (HCC)/PRABHAKAR  Feels that the Wellbutrin increase (300 mg XL) has improved her symptoms overall.  In final year of school so in a crunch to get it all done- summer and fall scheduled.  Overall doing well.   Depression Screening    Little interest or pleasure in doing things?  0 - not at all   Feeling down, depressed , or hopeless? 1 - several days   Trouble falling or staying asleep, or sleeping too much?  1 - several days   Feeling tired or having little energy?  1 - several days   Poor appetite or overeating?  1 - several days   Feeling bad about yourself - or that you are a failure or have let yourself or your family down? 0 - not at all   Trouble concentrating on things, such as reading the newspaper or watching television? 0 - not at all   Moving or speaking so slowly that other people could have noticed.  Or the opposite - being so fidgety or restless that you have been moving around a lot more than usual?  0 - not at all   Thoughts that you would be better off dead, or of hurting yourself?  0 - not at all   Patient Health Questionnaire Score: 4     Depression Screen (PHQ-2/PHQ-9) 9/28/2020 1/5/2021 4/12/2021   PHQ-2 Total Score 0 1 1   PHQ-9 Total Score 3 3 4     PRABHAKAR-7 Questionnaire  Feeling nervous, anxious, or on edge: More than half the days  Not being able to sop or control worrying: Not at all  Worrying too much about different things: Nearly every day  Trouble relaxing:  "Not at all  Being so restless that it's hard to sit still: Several days  Becoming easily annoyed or irritable: Nearly every day  Feeling afraid as if something awful might happen: Not at all  Total: 9     PRABHAKAR 7 1/5/2021 4/12/2021   PRABHAKAR-7 Total Score 12 9       Weight gain  Reports has gained weight back. Having a hard time balancing school and health.        Objective:   Ht 1.6 m (5' 3\") Comment: pt reported  Wt 104 kg (230 lb) Comment: pt reported  BMI 40.74 kg/m²     Physical Exam:  Constitutional: Alert, no distress, well-groomed.  Skin: No rashes in visible areas.  Eye: Round. Conjunctiva clear, lids normal. No icterus.   ENMT: Lips pink without lesions, good dentition, moist mucous membranes. Phonation normal.  Neck: No masses, no thyromegaly. Moves freely without pain.  Respiratory: Unlabored respiratory effort, no cough or audible wheeze  Psych: Alert and oriented x3, normal affect and mood.     Results for orders placed or performed in visit on 04/06/21   CBC WITH DIFFERENTIAL   Result Value Ref Range    WBC 8.8 3.4 - 10.8 x10E3/uL    RBC 4.96 3.77 - 5.28 x10E6/uL    Hemoglobin 14.7 11.1 - 15.9 g/dL    Hematocrit 44.5 34.0 - 46.6 %    MCV 90 79 - 97 fL    MCH 29.6 26.6 - 33.0 pg    MCHC 33.0 31.5 - 35.7 g/dL    RDW 13.0 11.7 - 15.4 %    Platelet Count 371 150 - 450 x10E3/uL    Neutrophils-Polys 60 Not Estab. %    Lymphocytes 31 Not Estab. %    Monocytes 8 Not Estab. %    Eosinophils 1 Not Estab. %    Basophils 0 Not Estab. %    Immature Cells CANCELED     Neutrophils (Absolute) 5.2 1.4 - 7.0 x10E3/uL    Lymphs (Absolute) 2.7 0.7 - 3.1 x10E3/uL    Monos (Absolute) 0.7 0.1 - 0.9 x10E3/uL    Eos (Absolute) 0.1 0.0 - 0.4 x10E3/uL    Baso (Absolute) 0.0 0.0 - 0.2 x10E3/uL    Immature Granulocytes 0 Not Estab. %    Immature Granulocytes (abs) 0.0 0.0 - 0.1 x10E3/uL    Nucleated RBC CANCELED     Comments-Diff CANCELED    COMP METABOLIC PANEL   Result Value Ref Range    Glucose 89 65 - 99 mg/dL    Bun 11 6 - 20 " mg/dL    Creatinine 0.95 0.57 - 1.00 mg/dL    GFR If Non  84 >59 mL/min/1.73    GFR If  97 >59 mL/min/1.73    Bun-Creatinine Ratio 12 9 - 23    Sodium 137 134 - 144 mmol/L    Potassium 5.2 3.5 - 5.2 mmol/L    Chloride 99 96 - 106 mmol/L    Co2 22 20 - 29 mmol/L    Calcium 9.8 8.7 - 10.2 mg/dL    Total Protein 7.0 6.0 - 8.5 g/dL    Albumin 4.0 3.9 - 5.0 g/dL    Globulin 3.0 1.5 - 4.5 g/dL    A-G Ratio 1.3 1.2 - 2.2    Total Bilirubin 0.5 0.0 - 1.2 mg/dL    Alkaline Phosphatase 73 39 - 117 IU/L    AST(SGOT) 15 0 - 40 IU/L    ALT(SGPT) 13 0 - 32 IU/L   LIPID PANEL   Result Value Ref Range    Cholesterol,Tot 217 (H) 100 - 199 mg/dL    Triglycerides 119 0 - 149 mg/dL    HDL 69 >39 mg/dL    VLDL Cholesterol Calc 21 5 - 40 mg/dL    LDL Chol Calc (NIH) 127 (H) 0 - 99 mg/dL    Comment: CANCELED    IRON/TOTAL IRON BIND   Result Value Ref Range    Total Iron Binding 422 250 - 450 ug/dL    Unsat Iron Binding 266 131 - 425 ug/dL    Iron 156 27 - 159 ug/dL    % Saturation 37 15 - 55 %   TSH RFLX TO T4F   Result Value Ref Range    TSH 2.870 0.450 - 4.500 uIU/mL   FERRITIN   Result Value Ref Range    Ferritin 110 15 - 150 ng/mL      Reviewed and discussed above labs with patient in visit.     Assessment and Plan:   The following treatment plan was discussed:     1. Moderate episode of recurrent major depressive disorder (HCC)  2. PRABHAKAR (generalized anxiety disorder)  Chronic, improving. Continue with medication as prescribed.     3. Morbid obesity with BMI of 40.0-44.9, adult (HCC)  Patient's body mass index is 40.74 kg/m². Exercise and nutrition counseling were performed at this visit.   Encouraged her to re-establish with [facility] Improvement Program physician.       Follow-up: Return in about 1 month (around 5/12/2021) for Pap, sooner if needed.

## 2021-05-10 ENCOUNTER — OFFICE VISIT (OUTPATIENT)
Dept: MEDICAL GROUP | Age: 26
End: 2021-05-10
Payer: COMMERCIAL

## 2021-05-10 ENCOUNTER — HOSPITAL ENCOUNTER (OUTPATIENT)
Facility: MEDICAL CENTER | Age: 26
End: 2021-05-10
Attending: PHYSICIAN ASSISTANT
Payer: COMMERCIAL

## 2021-05-10 VITALS
DIASTOLIC BLOOD PRESSURE: 72 MMHG | OXYGEN SATURATION: 99 % | HEART RATE: 101 BPM | BODY MASS INDEX: 40.89 KG/M2 | HEIGHT: 63 IN | WEIGHT: 230.8 LBS | SYSTOLIC BLOOD PRESSURE: 118 MMHG | TEMPERATURE: 97.8 F

## 2021-05-10 DIAGNOSIS — Z01.419 ENCOUNTER FOR GYNECOLOGICAL EXAMINATION: ICD-10-CM

## 2021-05-10 DIAGNOSIS — Z12.4 SCREENING FOR MALIGNANT NEOPLASM OF CERVIX: ICD-10-CM

## 2021-05-10 PROCEDURE — 87491 CHLMYD TRACH DNA AMP PROBE: CPT

## 2021-05-10 PROCEDURE — 87591 N.GONORRHOEAE DNA AMP PROB: CPT

## 2021-05-10 PROCEDURE — 88175 CYTOPATH C/V AUTO FLUID REDO: CPT

## 2021-05-10 PROCEDURE — 99395 PREV VISIT EST AGE 18-39: CPT | Performed by: PHYSICIAN ASSISTANT

## 2021-05-10 ASSESSMENT — FIBROSIS 4 INDEX: FIB4 SCORE: 0.27

## 2021-05-10 NOTE — PROGRESS NOTES
Subjective:     CC:   Chief Complaint   Patient presents with   • Annual Exam     w/PAP       HPI:   Danyelle Arevalo is a 26 y.o. female who presents for annual exam    Patient has GYN provider: No   Last Pap Smear: N/A  Last Tdap:   Received HPV series: Yes    Exercise: sporadic irregular exercise, <half hour walking weekly; working on getting to gym at [employer]  Diet: fair.      Patient's last menstrual period was 2021 (exact date).  Hx STDs: No - never sexually active  Birth control: OCP for menstrual cycle control/cramps  Menses every month with 4 days with moderate bleeding.  Denies cramping.  No significant bloating/fluid retention, pelvic pain. No abnormal vaginal discharge.  No breast tenderness, mass, nipple discharge, changes in size or contour, or abnormal cyclic discomfort.    OB History    Para Term  AB Living   0 0 0 0 0 0   SAB TAB Ectopic Molar Multiple Live Births   0 0 0 0 0 0      She  reports never being sexually active.    She  has a past medical history of DUB (dysfunctional uterine bleeding) (2015), H/O iron deficiency secondary to DUB (2015), Major depression (2014), and Tension headache (2014).  She  has a past surgical history that includes appendectomy ().    Family History   Problem Relation Age of Onset   • Hypertension Maternal Grandmother    • Cancer Maternal Grandfather 62        colon   • Diabetes Maternal Grandfather    • Hyperlipidemia Father    • Heart Attack Father 51   • Hyperlipidemia Maternal Uncle    • Hyperlipidemia Paternal Aunt    • Hyperlipidemia Paternal Aunt    • Hyperlipidemia Maternal Uncle    • Cancer Mother 47        Breast   • Cancer Paternal Grandmother         breast     Social History     Tobacco Use   • Smoking status: Never Smoker   • Smokeless tobacco: Never Used   Substance Use Topics   • Alcohol use: Not Currently     Comment: 1 drink a couple times a year   • Drug use: No       Patient Active Problem  List    Diagnosis Date Noted   • Major depressive disorder 07/27/2020   • Hypercholesterolemia 01/30/2020   • Weight gain 01/30/2020   • Chronic fatigue 01/30/2020   • Binge eating 01/30/2020   • Gastroesophageal reflux disease without esophagitis 01/30/2020   • Morbid obesity with BMI of 40.0-44.9, adult ([facility]) 11/28/2016   • DUB (dysfunctional uterine bleeding) 02/01/2015   • H/O iron deficiency secondary to DUB 02/01/2015   • Tension headache 12/12/2014     Current Outpatient Medications   Medication Sig Dispense Refill   • butalbital/apap/caffeine -40 mg (FIORICET) -40 MG Tab Take 1 tablet by mouth every four hours as needed for Headache.     • buPROPion (WELLBUTRIN XL) 300 MG XL tablet Take 1 tablet by mouth every morning. 90 tablet 1   • norethindrone-ethinyl estradiol-iron (BLISOVI FE 1.5/30) 1.5-30 MG-MCG tablet Take 1 Tab by mouth every day. 84 Tab 11   • ondansetron (ZOFRAN) 4 MG Tab tablet Take 1 Tab by mouth every four hours as needed for Nausea/Vomiting. 10 Tab 0     No current facility-administered medications for this visit.     Allergies   Allergen Reactions   • Latex      When apply will cause rash       Review of Systems   Constitutional: Negative for fever, chills and malaise/fatigue.   HENT: Positive for nasal congestion secondary to allergies.    Eyes: Negative for pain or blurred vision.   Respiratory: Negative for cough and shortness of breath.    Cardiovascular: Negative for chest pain, palpitations and leg swelling.   Gastrointestinal: Negative for nausea, vomiting, abdominal pain and diarrhea.   Genitourinary: Negative for dysuria and hematuria.   Skin: Negative for rash.   Neurological: Negative for dizziness, focal weakness. + HAs; needs fioricet twice a month  Endo/Heme/Allergies: Does not bruise/bleed easily.     Objective:   /72 (BP Location: Right arm, Patient Position: Sitting, BP Cuff Size: Adult)   Pulse (!) 101   Temp 36.6 °C (97.8 °F) (Temporal)   Ht 1.6 m (5'  "3\")   Wt 105 kg (230 lb 12.8 oz)   LMP 05/03/2021 (Exact Date)   SpO2 99%   BMI 40.88 kg/m²     Wt Readings from Last 4 Encounters:   05/10/21 105 kg (230 lb 12.8 oz)   04/12/21 104 kg (230 lb)   03/09/21 103 kg (226 lb)   01/05/21 103 kg (227 lb)     A chaperone was offered to the patient during today's exam. Patient declined chaperone.    Physical Exam:  Constitutional: Well-developed and well-nourished. Not diaphoretic. No distress.   Skin: Skin is warm and dry. No rash noted.  Head: Atraumatic without lesions.  Eyes: Conjunctivae and extraocular motions are normal. Pupils are equal, round, and reactive to light. No scleral icterus.   Ears:  External ears unremarkable. Tympanic membranes clear and intact.  Nose: Nares patent. Septum midline. Turbinates without erythema nor edema. No discharge.   Mouth/Throat: Tongue normal. Oropharynx is clear and moist. Posterior pharynx without erythema or exudates.  Neck: Supple, trachea midline. Normal range of motion. No thyromegaly present. No lymphadenopathy--cervical or supraclavicular.  Cardiovascular: Regular rate and rhythm, S1 and S2 without murmur, rubs, or gallops.    Respiratory: Effort normal. Clear to auscultation throughout. No adventitious sounds.   Breast: No skin changes, peau d'orange or nipple retraction. No discharge. No axillary or supraclavicular adenopathy. No masses or nodularity palpable.  Abdomen: Soft, non tender, and without distention. Active bowel sounds in all four quadrants. No rebound, guarding, masses or HSM.  : Perineum and external genitalia normal without rash. Vagina with normal and physiologic discharge. Cervix without visible lesions or discharge.   Neurological: Alert and oriented x 3. Grossly non-focal.   Psychiatric:  Behavior, mood, and affect are appropriate.    Assessment and Plan:     1. Encounter for gynecological examination  2. Screening for malignant neoplasm of cervix  pap obtained and sent to lab.   - Thinprep Rflx " HPV ASC and above w/CTNG; Future  Health maintenance:  Up-to-date    Patient counseled about skin care, diet, supplements, and exercise.  Discussed  breast self exam, mammography screening, use and side effects of OCP's, diet and exercise     Follow-up: Return in about 1 year (around 5/10/2022) for GYN exam; likely 3 years for next pap, sooner if needed.

## 2021-05-11 DIAGNOSIS — Z01.419 ENCOUNTER FOR GYNECOLOGICAL EXAMINATION: ICD-10-CM

## 2021-05-11 DIAGNOSIS — Z12.4 SCREENING FOR MALIGNANT NEOPLASM OF CERVIX: ICD-10-CM

## 2021-05-12 LAB
C TRACH DNA GENITAL QL NAA+PROBE: NEGATIVE
CYTOLOGY REG CYTOL: NORMAL
N GONORRHOEA DNA GENITAL QL NAA+PROBE: NEGATIVE
SPECIMEN SOURCE: NORMAL

## 2021-05-25 ENCOUNTER — TELEMEDICINE (OUTPATIENT)
Dept: MEDICAL GROUP | Age: 26
End: 2021-05-25
Payer: COMMERCIAL

## 2021-05-25 VITALS — WEIGHT: 230 LBS | BODY MASS INDEX: 40.75 KG/M2 | HEIGHT: 63 IN

## 2021-05-25 DIAGNOSIS — G89.29 CHRONIC BILATERAL THORACIC BACK PAIN: ICD-10-CM

## 2021-05-25 DIAGNOSIS — G89.29 CHRONIC MIDLINE LOW BACK PAIN WITHOUT SCIATICA: ICD-10-CM

## 2021-05-25 DIAGNOSIS — M54.6 CHRONIC BILATERAL THORACIC BACK PAIN: ICD-10-CM

## 2021-05-25 DIAGNOSIS — M62.838 TRAPEZIUS MUSCLE SPASM: ICD-10-CM

## 2021-05-25 DIAGNOSIS — M54.50 CHRONIC MIDLINE LOW BACK PAIN WITHOUT SCIATICA: ICD-10-CM

## 2021-05-25 PROCEDURE — 99214 OFFICE O/P EST MOD 30 MIN: CPT | Mod: 95,CR | Performed by: PHYSICIAN ASSISTANT

## 2021-05-25 RX ORDER — CYCLOBENZAPRINE HCL 5 MG
5 TABLET ORAL 3 TIMES DAILY PRN
Qty: 30 TABLET | Refills: 0 | Status: SHIPPED | OUTPATIENT
Start: 2021-05-25 | End: 2022-01-11

## 2021-05-25 RX ORDER — CYCLOBENZAPRINE HCL 5 MG
5 TABLET ORAL 3 TIMES DAILY PRN
Qty: 30 TABLET | Refills: 0 | Status: SHIPPED | OUTPATIENT
Start: 2021-05-25 | End: 2021-05-25 | Stop reason: CLARIF

## 2021-05-25 ASSESSMENT — PATIENT HEALTH QUESTIONNAIRE - PHQ9
5. POOR APPETITE OR OVEREATING: 2
6. FEELING BAD ABOUT YOURSELF - OR THAT YOU ARE A FAILURE OR HAVE LET YOURSELF OR YOUR FAMILY DOWN: 0
3. TROUBLE FALLING OR STAYING ASLEEP OR SLEEPING TOO MUCH: 1
4. FEELING TIRED OR HAVING LITTLE ENERGY: 1
SUM OF ALL RESPONSES TO PHQ9 QUESTIONS 1 AND 2: 0
2. FEELING DOWN, DEPRESSED, IRRITABLE, OR HOPELESS: 0
SUM OF ALL RESPONSES TO PHQ QUESTIONS 1-9: 4
1. LITTLE INTEREST OR PLEASURE IN DOING THINGS: 0
8. MOVING OR SPEAKING SO SLOWLY THAT OTHER PEOPLE COULD HAVE NOTICED. OR THE OPPOSITE, BEING SO FIGETY OR RESTLESS THAT YOU HAVE BEEN MOVING AROUND A LOT MORE THAN USUAL: 0
9. THOUGHTS THAT YOU WOULD BE BETTER OFF DEAD, OR OF HURTING YOURSELF: 0
CLINICAL INTERPRETATION OF PHQ2 SCORE: 0
7. TROUBLE CONCENTRATING ON THINGS, SUCH AS READING THE NEWSPAPER OR WATCHING TELEVISION: 0

## 2021-05-25 ASSESSMENT — FIBROSIS 4 INDEX: FIB4 SCORE: 0.27

## 2021-05-25 NOTE — PROGRESS NOTES
"Virtual Visit: Established Patient   This visit was conducted via Zoom using secure and encrypted videoconferencing technology. The patient was in a private location in the state of [place].    The patient's identity was confirmed and verbal consent was obtained for this virtual visit.    Subjective:   CC:   Chief Complaint   Patient presents with   • Back Pain     Inquiring about a breast reduction        Danyelle Arevalo is a 26 y.o. female presenting for evaluation and management of:    Reports back pain--in shoulder area/traps and between shoulder blades for the 8 years. Worsened since she has been working more over the last 5 months.   Describes pain as a \"sore pain\" like after \"working out really hard.\"  Every once in a while will take ibuprofen. Tries muscle massage-- some relief.   No chiropractic care.  Reports bra size of 46 DDD.   No recent injuries or trauma to the area.   No numbness, tingling, or weakness in arms.  Reports stiffness in the morning, but seems to be sleeping OK.  No worsening in frequency of headaches.    Has been experiencing low back pain since the fall.     Allergies   Allergen Reactions   • Latex      When apply will cause rash       Current medicines (including changes today)  Current Outpatient Medications   Medication Sig Dispense Refill   • cyclobenzaprine (FLEXERIL) 5 mg tablet Take 1 tablet by mouth 3 times a day as needed for Moderate Pain. 30 tablet 0   • butalbital/apap/caffeine -40 mg (FIORICET) -40 MG Tab Take 1 tablet by mouth every four hours as needed for Headache.     • buPROPion (WELLBUTRIN XL) 300 MG XL tablet Take 1 tablet by mouth every morning. 90 tablet 1   • norethindrone-ethinyl estradiol-iron (BLISOVI FE 1.5/30) 1.5-30 MG-MCG tablet Take 1 Tab by mouth every day. 84 Tab 11   • ondansetron (ZOFRAN) 4 MG Tab tablet Take 1 Tab by mouth every four hours as needed for Nausea/Vomiting. 10 Tab 0     No current facility-administered medications for " "this visit.        Objective:   Ht 1.6 m (5' 3\") Comment: pt reported  Wt 104 kg (230 lb) Comment: pt reported  LMP 05/03/2021 (Exact Date)   BMI 40.74 kg/m²     Physical Exam:  Constitutional: Alert, no distress, well-groomed.  Skin: No rashes in visible areas.  Eye: Round. Conjunctiva clear, lids normal. No icterus.   ENMT: Lips pink without lesions, good dentition, moist mucous membranes. Phonation normal.  Neck: No masses, no thyromegaly. Moves freely without pain.  Respiratory: Unlabored respiratory effort, no cough or audible wheeze  Psych: Alert and oriented x3, normal affect and mood.     Assessment and Plan:   The following treatment plan was discussed:     1. Chronic bilateral thoracic back pain  2. Trapezius muscle spasm  Patient with large breasts and chronic upper back pain exacerbated over the last 6 months. Stretches demonstrated and performed today. Some muscle relaxers were sent to pharmacy for night time use primarily and to start. Discussed potential side effects of medication with patient.  Will send her in for breast reduction consultation.   - REFERRAL TO PLASTIC SURGERY  - cyclobenzaprine (FLEXERIL) 5 mg tablet; Take 1 tablet by mouth 3 times a day as needed for Moderate Pain.  Dispense: 30 tablet; Refill: 0    3. Chronic midline low back pain without sciatica  Chronic, stable. Continue with stretches. Muscle relaxer to help.        Follow-up: Return if symptoms worsen or fail to improve.         "

## 2021-08-09 ENCOUNTER — OFFICE VISIT (OUTPATIENT)
Dept: MEDICAL GROUP | Age: 26
End: 2021-08-09
Payer: COMMERCIAL

## 2021-08-09 VITALS
SYSTOLIC BLOOD PRESSURE: 118 MMHG | DIASTOLIC BLOOD PRESSURE: 66 MMHG | HEIGHT: 63 IN | WEIGHT: 236.8 LBS | OXYGEN SATURATION: 96 % | BODY MASS INDEX: 41.96 KG/M2 | TEMPERATURE: 97.8 F | HEART RATE: 104 BPM

## 2021-08-09 DIAGNOSIS — G43.009 MIGRAINE WITHOUT AURA AND WITHOUT STATUS MIGRAINOSUS, NOT INTRACTABLE: ICD-10-CM

## 2021-08-09 DIAGNOSIS — L73.1 INGROWN HAIR: ICD-10-CM

## 2021-08-09 PROCEDURE — 99213 OFFICE O/P EST LOW 20 MIN: CPT | Performed by: PHYSICIAN ASSISTANT

## 2021-08-09 ASSESSMENT — FIBROSIS 4 INDEX: FIB4 SCORE: 0.27

## 2021-08-09 ASSESSMENT — PATIENT HEALTH QUESTIONNAIRE - PHQ9
SUM OF ALL RESPONSES TO PHQ QUESTIONS 1-9: 6
CLINICAL INTERPRETATION OF PHQ2 SCORE: 2
5. POOR APPETITE OR OVEREATING: 2 - MORE THAN HALF THE DAYS

## 2021-08-09 NOTE — PROGRESS NOTES
"  Subjective:     Chief Complaint   Patient presents with   • Bump     x's 4 days, has gotten better.   • Migraine     Meds are not working; would like an alternative      Danyelle Oliveros Ana Arevalo is a 26 y.o. female here today for evaluation and management of:    Lesion  Reports lesion on perineum. First noticed about 10 days ago.   Thought it was a pimple--popped it; has been using neosporin and thinks gone now.  Reports about the size of a marble. Felt deep.   No pain anymore; was painful when swollen.  No issues with Bowel movements. No blood or mucus.   Denies vaginal discharge, pelvic pain. No change in cycle.    Migraines  Reports had a \"bad one\" a couple days ago.  Was on left side; normally can use pressure points to help.  Reports couldn't fall asleep due to pain.  Tried Tyelnol and ibuprofen before fioricet- no relief.  Reports lasted 6-7 hours. Didn't \"get bad\" until 3 hours in.  Reports she vomited which helped.   Used ice on back of neck.     Reports started in neck and moved to left temple.  3/5 headaches are unilateral.     Occurring about once or twice a month.     Current medicines (including changes today)  Current Outpatient Medications   Medication Sig Dispense Refill   • cyclobenzaprine (FLEXERIL) 5 mg tablet Take 1 tablet by mouth 3 times a day as needed for Moderate Pain. 30 tablet 0   • butalbital/apap/caffeine -40 mg (FIORICET) -40 MG Tab Take 1 tablet by mouth every four hours as needed for Headache.     • buPROPion (WELLBUTRIN XL) 300 MG XL tablet Take 1 tablet by mouth every morning. 90 tablet 1   • norethindrone-ethinyl estradiol-iron (BLISOVI FE 1.5/30) 1.5-30 MG-MCG tablet Take 1 Tab by mouth every day. 84 Tab 11   • ondansetron (ZOFRAN) 4 MG Tab tablet Take 1 Tab by mouth every four hours as needed for Nausea/Vomiting. 10 Tab 0     No current facility-administered medications for this visit.        Objective:     Vitals:    08/09/21 0738   BP: 118/66   BP Location: Right arm " "  Patient Position: Sitting   BP Cuff Size: Adult   Pulse: (!) 104   Temp: 36.6 °C (97.8 °F)   TempSrc: Temporal   SpO2: 96%   Weight: 107 kg (236 lb 12.8 oz)   Height: 1.6 m (5' 3\")     Body mass index is 41.95 kg/m².     A chaperone was offered to the patient during today's exam. Patient declined chaperone.      Physical Exam:  Constitutional: Alert, no distress, well-groomed.  Skin: Warm, dry, good turgor, no rashes in visible areas.  Eye: Equal, round and reactive, conjunctiva clear, lids normal.  Neck: Trachea midline, no masses, no thyromegaly.  Respiratory: Unlabored respiratory effort, no cough.  Abdomen: Soft, no gross masses.  PELVIC:Perineum and external genitalia normal without rash.    MSK: Normal gait, moves all extremities.  Neuro: Grossly non-focal. No cranial nerve deficit. Strength and sensation intact.   Psych: Alert and oriented x3, normal affect and mood.       Assessment and Plan:   The following treatment plan was discussed:     1. Migraine without aura and without status migrainosus, not intractable  Chronic, stable. This was a one off and seems likely related to miss OCP dose. If occurring more frequently, will consider prophylactic treatment. She does have flexeril on hand--OK to use in future.    2. Ingrown hair  Resolved. No issues today. Seems related to waxing. Avoid popping in future due to risk of infection. Discussed warm compress and sitz bath.     Health Maintenance: Up-to-date     Followup: No follow-ups on file.             "

## 2021-09-20 DIAGNOSIS — F33.1 MODERATE EPISODE OF RECURRENT MAJOR DEPRESSIVE DISORDER (HCC): ICD-10-CM

## 2021-09-21 RX ORDER — BUPROPION HYDROCHLORIDE 300 MG/1
TABLET ORAL
Qty: 90 TABLET | Refills: 3 | Status: SHIPPED | OUTPATIENT
Start: 2021-09-21 | End: 2022-09-19

## 2021-10-25 ENCOUNTER — OFFICE VISIT (OUTPATIENT)
Dept: MEDICAL GROUP | Age: 26
End: 2021-10-25
Payer: COMMERCIAL

## 2021-10-25 VITALS
BODY MASS INDEX: 42.91 KG/M2 | HEART RATE: 115 BPM | SYSTOLIC BLOOD PRESSURE: 126 MMHG | TEMPERATURE: 97.6 F | OXYGEN SATURATION: 97 % | DIASTOLIC BLOOD PRESSURE: 60 MMHG | HEIGHT: 63 IN | WEIGHT: 242.2 LBS

## 2021-10-25 DIAGNOSIS — B07.8 OTHER VIRAL WARTS: ICD-10-CM

## 2021-10-25 DIAGNOSIS — Z23 NEED FOR VACCINATION: ICD-10-CM

## 2021-10-25 DIAGNOSIS — F41.9 ANXIETY: ICD-10-CM

## 2021-10-25 DIAGNOSIS — R00.0 TACHYCARDIA: ICD-10-CM

## 2021-10-25 PROCEDURE — 90471 IMMUNIZATION ADMIN: CPT | Performed by: PHYSICIAN ASSISTANT

## 2021-10-25 PROCEDURE — 99213 OFFICE O/P EST LOW 20 MIN: CPT | Mod: 25 | Performed by: PHYSICIAN ASSISTANT

## 2021-10-25 PROCEDURE — 90686 IIV4 VACC NO PRSV 0.5 ML IM: CPT | Performed by: PHYSICIAN ASSISTANT

## 2021-10-25 PROCEDURE — 93000 ELECTROCARDIOGRAM COMPLETE: CPT | Performed by: PHYSICIAN ASSISTANT

## 2021-10-25 ASSESSMENT — ANXIETY QUESTIONNAIRES
5. BEING SO RESTLESS THAT IT IS HARD TO SIT STILL: SEVERAL DAYS
3. WORRYING TOO MUCH ABOUT DIFFERENT THINGS: NEARLY EVERY DAY
GAD7 TOTAL SCORE: 11
4. TROUBLE RELAXING: SEVERAL DAYS
2. NOT BEING ABLE TO STOP OR CONTROL WORRYING: SEVERAL DAYS
1. FEELING NERVOUS, ANXIOUS, OR ON EDGE: NEARLY EVERY DAY
7. FEELING AFRAID AS IF SOMETHING AWFUL MIGHT HAPPEN: MORE THAN HALF THE DAYS
6. BECOMING EASILY ANNOYED OR IRRITABLE: NOT AT ALL
IF YOU CHECKED OFF ANY PROBLEMS ON THIS QUESTIONNAIRE, HOW DIFFICULT HAVE THESE PROBLEMS MADE IT FOR YOU TO DO YOUR WORK, TAKE CARE OF THINGS AT HOME, OR GET ALONG WITH OTHER PEOPLE: NOT DIFFICULT AT ALL

## 2021-10-25 ASSESSMENT — FIBROSIS 4 INDEX: FIB4 SCORE: 0.27

## 2021-10-25 ASSESSMENT — PATIENT HEALTH QUESTIONNAIRE - PHQ9: CLINICAL INTERPRETATION OF PHQ2 SCORE: 0

## 2021-10-25 NOTE — LETTER
October 25, 2021         Patient: Danyelle Arevalo   YOB: 1995   Date of Visit: 10/25/2021           To Whom it May Concern:    Danyelle Arevalo was seen in my clinic on 10/25/2021. She may return to school on 10/25/2021.    If you have any questions or concerns, please don't hesitate to call.        Sincerely,           Shiela Jasmine P.A.-C.  Electronically Signed

## 2021-10-25 NOTE — PROGRESS NOTES
"  Subjective:     Chief Complaint   Patient presents with   • Warts     R hand x's 3 mnths     Danyelle Arevalo is a 26 y.o. female here today for evaluation and management of:    Other viral warts  Reports growth on right hand about three months ago. Used OTC wart removal last month--dried out some but relatively unchanged.    Anxiety and tachycardia  Reports lately has been experiencing night time anxiety with irrational fear of death.   Has been battling headache last couple days--noticed some dizziness and today heart rate is elevated.   Denies chest pain, shortness of breath, back pain or leg swelling. No dizziness today.      Current medicines (including changes today)  Current Outpatient Medications   Medication Sig Dispense Refill   • buPROPion (WELLBUTRIN XL) 300 MG XL tablet TAKE 1 TABLET EVERY MORNING 90 Tablet 3   • cyclobenzaprine (FLEXERIL) 5 mg tablet Take 1 tablet by mouth 3 times a day as needed for Moderate Pain. 30 tablet 0   • butalbital/apap/caffeine -40 mg (FIORICET) -40 MG Tab Take 1 tablet by mouth every four hours as needed for Headache.     • norethindrone-ethinyl estradiol-iron (BLISOVI FE 1.5/30) 1.5-30 MG-MCG tablet Take 1 Tab by mouth every day. 84 Tab 11   • ondansetron (ZOFRAN) 4 MG Tab tablet Take 1 Tab by mouth every four hours as needed for Nausea/Vomiting. 10 Tab 0     No current facility-administered medications for this visit.        Objective:     Vitals:    10/25/21 1403   BP: 126/60   BP Location: Left arm   Patient Position: Sitting   BP Cuff Size: Adult   Pulse: (!) 115   Temp: 36.4 °C (97.6 °F)   TempSrc: Temporal   SpO2: 97%   Weight: 110 kg (242 lb 3.2 oz)   Height: 1.6 m (5' 3\")     Body mass index is 42.9 kg/m².     Physical Exam:  Constitutional: Alert, no distress, well-groomed.  Skin: Warm, dry, good turgor, no rashes in visible areas. Dorsal aspect of right hand with round, flesh colored papule. Rough appearance.  Eye: Equal, round and reactive, " conjunctiva clear, lids normal.  Neck: Trachea midline, no masses, no thyromegaly.  Cardiovascular: Regular rate and rhythm.  Chest: Effort normal. Clear to auscultation throughout. No adventitious sounds.   Abdomen: Soft, no gross masses.  MSK: Normal gait, moves all extremities.  Neuro: Grossly non-focal. No cranial nerve deficit. Strength and sensation intact.   Psych: Alert and oriented x3, normal affect and mood.     EKG Interpretation- normal EKG, normal sinus rhythm, unchanged from previous tracings    Component      Latest Ref Rng & Units 4/6/2021   WBC      3.4 - 10.8 x10E3/uL 8.8   RBC      3.77 - 5.28 x10E6/uL 4.96   Hemoglobin      11.1 - 15.9 g/dL 14.7   Hematocrit      34.0 - 46.6 % 44.5   MCV      79 - 97 fL 90   MCH      26.6 - 33.0 pg 29.6   MCHC      31.5 - 35.7 g/dL 33.0   RDW      11.7 - 15.4 % 13.0   Platelet Count      150 - 450 x10E3/uL 371   Neutrophils-Polys      Not Estab. % 60   Lymphocytes      Not Estab. % 31   Monocytes      Not Estab. % 8   Eosinophils      Not Estab. % 1   Basophils      Not Estab. % 0   Immature Cells       CANCELED   Neutrophils (Absolute)      1.4 - 7.0 x10E3/uL 5.2   Lymphs (Absolute)      0.7 - 3.1 x10E3/uL 2.7   Monos (Absolute)      0.1 - 0.9 x10E3/uL 0.7   Eos (Absolute)      0.0 - 0.4 x10E3/uL 0.1   Baso (Absolute)      0.0 - 0.2 x10E3/uL 0.0   Immature Granulocytes      Not Estab. % 0   Immature Granulocytes (abs)      0.0 - 0.1 x10E3/uL 0.0   Nucleated RBC       CANCELED   Comments-Diff       CANCELED   Glucose      65 - 99 mg/dL 89   Bun      6 - 20 mg/dL 11   Creatinine      0.57 - 1.00 mg/dL 0.95   GFR If Non       >59 mL/min/1.73 84   GFR If African American      >59 mL/min/1.73 97   Bun-Creatinine Ratio      9 - 23 12   Sodium      134 - 144 mmol/L 137   Potassium      3.5 - 5.2 mmol/L 5.2   Chloride      96 - 106 mmol/L 99   Co2      20 - 29 mmol/L 22   Calcium      8.7 - 10.2 mg/dL 9.8   Total Protein      6.0 - 8.5 g/dL 7.0    Albumin      3.9 - 5.0 g/dL 4.0   Globulin      1.5 - 4.5 g/dL 3.0   A-G Ratio      1.2 - 2.2 1.3   Total Bilirubin      0.0 - 1.2 mg/dL 0.5   Alkaline Phosphatase      39 - 117 IU/L 73   AST(SGOT)      0 - 40 IU/L 15   ALT(SGPT)      0 - 32 IU/L 13   Cholesterol,Tot      100 - 199 mg/dL 217 (H)   Triglycerides      0 - 149 mg/dL 119   HDL      >39 mg/dL 69   VLDL Cholesterol Calc      5 - 40 mg/dL 21   LDL Chol Calc (NIH)      0 - 99 mg/dL 127 (H)   Comment:       CANCELED   Total Iron Binding      250 - 450 ug/dL 422   Unsat Iron Binding      131 - 425 ug/dL 266   Iron      27 - 159 ug/dL 156   % Saturation      15 - 55 % 37   TSH      0.450 - 4.500 uIU/mL 2.870   Ferritin      15 - 150 ng/mL 110   Reviewed and discussed above labs with patient in visit.     Assessment and Plan:   The following treatment plan was discussed:     1. Other viral warts  Chronic, stable. Liquid nitrogen was applied for 10-12 seconds to the skin lesion and the expected blistering or scabbing reaction explained. Patient reminded not pick at the area and to expect hypopigmented scars from the procedure. Return if lesion fails to fully resolve.     2. Tachycardia  Elevated in office. 120s upon recheck. EKG normal She did take a number of medications yesterday for tension headache and is not adequately hydrated. She will monitor her symptoms and let me know how she feels. Will obtain additional labs if no improvement. Might need ED or additional follow-up visit for worsening symptoms.  - EKG - Clinic Performed    3. Need for vaccination  VIS given. Verbal informed consent obtained. Vaccine administered in office.   - INFLUENZA VACCINE QUAD INJ (PF)    4. Anxiety  Chronic, worsening. Encouraged therapy and meditation. May consider atarax at night if no improvement.     Health Maintenance: Up-to-date     Followup: Return if symptoms worsen or fail to improve.

## 2021-11-08 ENCOUNTER — OFFICE VISIT (OUTPATIENT)
Dept: MEDICAL GROUP | Age: 26
End: 2021-11-08
Payer: COMMERCIAL

## 2021-11-08 DIAGNOSIS — B07.8 OTHER VIRAL WARTS: ICD-10-CM

## 2021-11-08 PROCEDURE — 17110 DESTRUCTION B9 LES UP TO 14: CPT | Performed by: PHYSICIAN ASSISTANT

## 2021-11-08 NOTE — PROGRESS NOTES
"  Subjective:     Chief Complaint   Patient presents with   • Warts     Danyelle Arevalo is a 26 y.o. female here today for evaluation and management of:    Viral wart- darkened then scabbed. Some \"seeds\" still present. Would like another application.        Objective:     Physical Exam:  Constitutional: Alert, no distress, well-groomed.  Skin: Warm, dry, good turgor, no rashes in visible areas. Dorsum of right hand with area of erythema. Small hyperkeratotic area. .  Psych: Alert and oriented x3, normal affect and mood.     Assessment and Plan:   The following treatment plan was discussed:     1. Other viral warts  Liquid nitrogen was applied for 10-12 seconds to the skin lesion and the expected blistering or scabbing reaction explained. Patient reminded not pick at the area and to expect hypopigmented scars from the procedure. Return if lesion fails to fully resolve.     Followup: PRN             "

## 2022-01-10 ENCOUNTER — OFFICE VISIT (OUTPATIENT)
Dept: MEDICAL GROUP | Age: 27
End: 2022-01-10
Payer: COMMERCIAL

## 2022-01-10 VITALS
HEART RATE: 86 BPM | TEMPERATURE: 98 F | SYSTOLIC BLOOD PRESSURE: 120 MMHG | WEIGHT: 243 LBS | DIASTOLIC BLOOD PRESSURE: 80 MMHG | HEIGHT: 63 IN | BODY MASS INDEX: 43.05 KG/M2 | OXYGEN SATURATION: 97 %

## 2022-01-10 DIAGNOSIS — R09.81 NASAL CONGESTION: ICD-10-CM

## 2022-01-10 DIAGNOSIS — R06.02 SHORTNESS OF BREATH: ICD-10-CM

## 2022-01-10 DIAGNOSIS — R05.9 COUGH: ICD-10-CM

## 2022-01-10 DIAGNOSIS — U07.1 COVID-19: ICD-10-CM

## 2022-01-10 DIAGNOSIS — Z20.822 CLOSE EXPOSURE TO COVID-19 VIRUS: ICD-10-CM

## 2022-01-10 LAB
EXTERNAL QUALITY CONTROL: NORMAL
SARS-COV+SARS-COV-2 AG RESP QL IA.RAPID: POSITIVE

## 2022-01-10 PROCEDURE — 87426 SARSCOV CORONAVIRUS AG IA: CPT | Mod: QW | Performed by: PHYSICIAN ASSISTANT

## 2022-01-10 PROCEDURE — 99214 OFFICE O/P EST MOD 30 MIN: CPT | Mod: CS | Performed by: PHYSICIAN ASSISTANT

## 2022-01-10 ASSESSMENT — PATIENT HEALTH QUESTIONNAIRE - PHQ9
SUM OF ALL RESPONSES TO PHQ QUESTIONS 1-9: 7
5. POOR APPETITE OR OVEREATING: 2 - MORE THAN HALF THE DAYS
CLINICAL INTERPRETATION OF PHQ2 SCORE: 1

## 2022-01-10 ASSESSMENT — FIBROSIS 4 INDEX: FIB4 SCORE: 0.27

## 2022-01-10 NOTE — PROGRESS NOTES
SUBJECTIVE  Chief Complaint   Patient presents with   • Sinus Problem     possible infection   • Cough   • Pharyngitis   • Ear Fullness   • Fatigue   • Coronavirus Screening     exposed to positive x1 week       HPI: Danyelle Arevalo is a 26 y.o. female presenting with Sinus Problem (possible infection), Cough, Pharyngitis, Ear Fullness, Fatigue, and Coronavirus Screening (exposed to positive x1 week)    Symptoms started 1/6/22 (negative rapid same day);   Exposure to positive on 1/4/22--brother (live together)    Symptoms started with coughing and sneezing. +nasal congestion  +short of breath and fatigue  No headaches or fever. No loss of taste or body aches. No chills or rigors.   Yesterday was feeling a little better but woke up feeling poorly today.     Has been intermittently ill since September--nephew, 4 year old, whenever he is sick she is sick.  Symptoms seems similar each time she is sick.   Under a lot of stress between work and school.     Has taken some ibuprofen per mom. No other treatments tried.      Current Outpatient Medications on File Prior to Visit   Medication Sig Dispense Refill   • buPROPion (WELLBUTRIN XL) 300 MG XL tablet TAKE 1 TABLET EVERY MORNING 90 Tablet 3   • butalbital/apap/caffeine -40 mg (FIORICET) -40 MG Tab Take 1 tablet by mouth every four hours as needed for Headache.     • norethindrone-ethinyl estradiol-iron (BLISOVI FE 1.5/30) 1.5-30 MG-MCG tablet Take 1 Tab by mouth every day. 84 Tab 11   • ondansetron (ZOFRAN) 4 MG Tab tablet Take 1 Tab by mouth every four hours as needed for Nausea/Vomiting. 10 Tab 0   • cyclobenzaprine (FLEXERIL) 5 mg tablet Take 1 tablet by mouth 3 times a day as needed for Moderate Pain. (Patient not taking: Reported on 1/10/2022) 30 tablet 0     No current facility-administered medications on file prior to visit.       OBJECTIVE  Vitals:    01/10/22 1047   BP: 120/80   BP Location: Right arm   Patient Position: Sitting   BP Cuff Size:  "Large adult   Pulse: 86   Temp: 36.7 °C (98 °F)   TempSrc: Temporal   SpO2: 97%   Weight: 110 kg (243 lb)   Height: 1.6 m (5' 3\")     Body mass index is 43.05 kg/m².     Physical Exam  Gen: alert, No conversational dyspnea. No audible wheeze, nontoxic.  PERRL, conjunctiva noninjected. No photophobia. No eye discharge.  Ears: normal pinnae. TM: pearly gray bilaterally  Nose: Nares patent with thin mucus. Mucosa edematous with erythema.  Sinuses non tender over maxillary / frontal sinuses  Throat: erythematous injection. No exudate.   Neck: supple, with  moderately enlarged cervical nodes  CV: Regular rate and rhythm.  Lungs: Effort is normal.  clear to auscultation, no wheezing, no retraction, no stridor, good air exchange.   Abdomen soft. No HSM.   Skin: warm and dry. No rash.    A/P  1. COVID-19  2. Cough  3. Close exposure to COVID-19 virus  4. Nasal congestion  5. Shortness of breath  - Discussed over-the-counter medications to use for symptomatic relief. She has albuterol inhaler--asked her to use every 4-6 hours as needed for cough/shortness of breath. Add Flonase daily x 1-2 weeks.   Keep well-hydrated and rest.  - Educated patient on natural course of benign viral URI's and COVID.  - OTC anti-pyretics and decongestants as needed.  - Isolation x 5 days then 5 days of strict mask wearing public assuming she remains afebrile and symptoms are improving.   - Anticipatory guidance for symptom progression and when to seek higher level of care and how to perform self care.  - All questions answered.  - Follow-up for worsening symptoms, difficulty breathing, lack of expected recovery, or should new symptoms or problems arise.   - POCT SARS-COV Antigen GELN (Symptomatic Only)  - Advised booster with mrna vaccine once well.      Followup if no improvement in symptoms in 1-2 weeks, sooner if any worsening symptoms.  "

## 2022-01-10 NOTE — LETTER
January 10, 2022         Patient: Danyelle Arevalo   YOB: 1995   Date of Visit: 1/10/2022           To Whom it May Concern:    Danyelle Arevalo was seen in my clinic on 1/10/2022. She may return to school on 1/12/22.    If you have any questions or concerns, please don't hesitate to call.        Sincerely,           Shiela Jasmine P.A.-C.  Electronically Signed

## 2022-01-23 ENCOUNTER — PHARMACY VISIT (OUTPATIENT)
Dept: PHARMACY | Facility: MEDICAL CENTER | Age: 27
End: 2022-01-23
Payer: COMMERCIAL

## 2022-01-23 PROCEDURE — RXMED WILLOW AMBULATORY MEDICATION CHARGE: Performed by: INTERNAL MEDICINE

## 2022-01-23 RX ORDER — RNA INGREDIENT BNT-162B2 0.23 G/1.8ML
INJECTION, SUSPENSION INTRAMUSCULAR
Qty: 1.8 ML | Refills: 0 | Status: SHIPPED | OUTPATIENT
Start: 2022-01-23 | End: 2022-04-18

## 2022-03-07 DIAGNOSIS — N93.8 DUB (DYSFUNCTIONAL UTERINE BLEEDING): ICD-10-CM

## 2022-03-08 RX ORDER — NORETHINDRONE ACETATE AND ETHINYL ESTRADIOL AND FERROUS FUMARATE 1.5-30(21)
KIT ORAL
Qty: 84 TABLET | Refills: 3 | Status: SHIPPED | OUTPATIENT
Start: 2022-03-08 | End: 2023-02-06

## 2022-04-18 ENCOUNTER — OFFICE VISIT (OUTPATIENT)
Dept: MEDICAL GROUP | Age: 27
End: 2022-04-18
Payer: COMMERCIAL

## 2022-04-18 VITALS
TEMPERATURE: 98.7 F | HEART RATE: 96 BPM | BODY MASS INDEX: 43.23 KG/M2 | HEIGHT: 63 IN | OXYGEN SATURATION: 98 % | DIASTOLIC BLOOD PRESSURE: 70 MMHG | WEIGHT: 244 LBS | SYSTOLIC BLOOD PRESSURE: 110 MMHG

## 2022-04-18 DIAGNOSIS — S76.311A HAMSTRING STRAIN, RIGHT, INITIAL ENCOUNTER: ICD-10-CM

## 2022-04-18 DIAGNOSIS — R55 VASOVAGAL SYNCOPE: ICD-10-CM

## 2022-04-18 DIAGNOSIS — E66.01 MORBID OBESITY WITH BMI OF 40.0-44.9, ADULT (HCC): ICD-10-CM

## 2022-04-18 PROBLEM — Z80.3 FAMILY HISTORY OF BREAST CANCER: Status: ACTIVE | Noted: 2022-04-18

## 2022-04-18 PROCEDURE — 99214 OFFICE O/P EST MOD 30 MIN: CPT | Performed by: PHYSICIAN ASSISTANT

## 2022-04-18 ASSESSMENT — FIBROSIS 4 INDEX: FIB4 SCORE: 0.28

## 2022-04-18 NOTE — PROGRESS NOTES
"  Subjective:     Chief Complaint   Patient presents with   • Hypertension   • Knee Pain     Right side x 7 months     Danyelle Arevalo is a 27 y.o. female here today for evaluation and management of:    Syncope  Went to cycle class last Tuesday. Seven minutes into class started experiencing dizziness. She had a syncopal event. [facility] was called.  Blood pressure was 92/50 with pulse of 105 and with standing dropped to 74/42 and pulse of 136. And blood sugar was 130- fasting.  EKG was normal.  She hadn't eaten and reports she really didn't drink much that day.   Has felt fine since that date-- no issues.  Reports had a syncopal event a few years ago with exercising... similar presentation.   No chest pain or palpitations. Reports she has no issues normally.   Denies nocturia or polyuria. Denies polydipsia. Denies neuropathies and blurred vision.     Pain of right knee  Hurt right knee a few months ago and has been bothering her since.   Some pain in lateral aspect of knee--only some movements.  Some times has some discomfort with stairs-- going down specifically.  Wearing a compression sleeve- provides relief.  No medications.   Looked up \"ACL stretches\" and has been doing those  Has futon mattress on floor and often lying in awkward positions. Sleeps with hip internally rotated with lower leg off to side.    Morbid Obesity--working with [employer] for mental health related to weight. Considering partial hospitalization program.    Current medicines (including changes today)  Current Outpatient Medications   Medication Sig Dispense Refill   • JUNEL FE 1.5/30 1.5-30 MG-MCG tablet TAKE 1 TABLET DAILY 84 Tablet 3   • buPROPion (WELLBUTRIN XL) 300 MG XL tablet TAKE 1 TABLET EVERY MORNING 90 Tablet 3   • butalbital/apap/caffeine -40 mg (FIORICET) -40 MG Tab Take 1 tablet by mouth every four hours as needed for Headache.     • ondansetron (ZOFRAN) 4 MG Tab tablet Take 1 Tab by mouth every four hours as needed " "for Nausea/Vomiting. 10 Tab 0   • COVID-19 mRNA Vaccine, Pfizer, (PFIZER-Message Missile COVID-19 VACC) 30 MCG/0.3ML Suspension injection Inject  into the shoulder, thigh, or buttocks. (Patient not taking: Reported on 4/18/2022) 1.8 mL 0   • albuterol 108 (90 Base) MCG/ACT Aero Soln inhalation aerosol Inhale 2 Puffs every four hours as needed (cough or shortness of breath). 1 Each 0     No current facility-administered medications for this visit.        Objective:     Vitals:    04/18/22 0706   BP: 110/70   BP Location: Left arm   Patient Position: Sitting   BP Cuff Size: Large adult   Pulse: 96   Temp: 37.1 °C (98.7 °F)   TempSrc: Temporal   SpO2: 98%   Weight: 111 kg (244 lb)   Height: 1.6 m (5' 3\")     Body mass index is 43.22 kg/m².     Physical Exam:  Constitutional: Alert, no distress, well-groomed.  Skin: Warm, dry, good turgor, no rashes in visible areas.  Eye: Equal, round and reactive, conjunctiva clear, lids normal.  Neck: Trachea midline, no masses, no thyromegaly.  Cardiovascular: Regular rate and rhythm.  Chest: Effort normal. Clear to auscultation throughout. No adventitious sounds.   Abdomen: Soft, no gross masses.  Knee/leg exam: No erythema/edema/ecchymosis/effusion. Tenderness to palpation distal attachment of lateral hamstring. Joint line tenderness- negative. Facet tenderness to palpation-negative. Patellar grind test- negative. ROM- L full Rfull. LCL/MCL intact with varus and valgus stress. Kenyon's negative. Lachman's negative. 5/5 strength bilaterally. 2+ deep tendon reflexes bilaterally.   Neuro: Grossly non-focal. No cranial nerve deficit. Strength and sensation intact.   Psych: Alert and oriented x3, normal affect and mood.      Assessment and Plan:   The following treatment plan was discussed:     1. Vasovagal syncope  Seems isolated and related to exercise without adequate caloric intake or hydration. As this has occurred once in the past (under same circumstances) will obtain labs and echo. " No palpitations so will hold on holter.  - EC-ECHOCARDIOGRAM COMPLETE W/O CONT; Future  - Comp Metabolic Panel; Future  - CBC WITH DIFFERENTIAL; Future  - Lipid Profile; Future  - TSH WITH REFLEX TO FT4; Future    2. Hamstring strain, right, initial encounter  Self limited, discussed injury and pathology.   - Performed and demonstrated stretches in office with patient. Will do at minimum BID.  -- pt provided with additional education material     3. Morbid obesity with BMI of 40.0-44.9, adult (HCC)  Chronic, stable. Continue lifestyle modifications--encouraged follow through with [facility].  - Patient identified as having weight management issue.  Appropriate orders and counseling given.      Health Maintenance: Up-to-date     Followup: Depending on lab results. Otherwise she sal[place] establish with new PCP

## 2022-04-26 LAB
ALBUMIN SERPL-MCNC: 3.9 G/DL (ref 3.9–5)
ALBUMIN/GLOB SERPL: 1.5 {RATIO} (ref 1.2–2.2)
ALP SERPL-CCNC: 67 IU/L (ref 44–121)
ALT SERPL-CCNC: 14 IU/L (ref 0–32)
AST SERPL-CCNC: 15 IU/L (ref 0–40)
BASOPHILS # BLD AUTO: 0 X10E3/UL (ref 0–0.2)
BASOPHILS NFR BLD AUTO: 1 %
BILIRUB SERPL-MCNC: 0.4 MG/DL (ref 0–1.2)
BUN SERPL-MCNC: 7 MG/DL (ref 6–20)
BUN/CREAT SERPL: 7 (ref 9–23)
CALCIUM SERPL-MCNC: 9.2 MG/DL (ref 8.7–10.2)
CHLORIDE SERPL-SCNC: 104 MMOL/L (ref 96–106)
CHOLEST SERPL-MCNC: 190 MG/DL (ref 100–199)
CO2 SERPL-SCNC: 21 MMOL/L (ref 20–29)
CREAT SERPL-MCNC: 0.98 MG/DL (ref 0.57–1)
EGFRCR SERPLBLD CKD-EPI 2021: 82 ML/MIN/1.73
EOSINOPHIL # BLD AUTO: 0.1 X10E3/UL (ref 0–0.4)
EOSINOPHIL NFR BLD AUTO: 2 %
ERYTHROCYTE [DISTWIDTH] IN BLOOD BY AUTOMATED COUNT: 14.3 % (ref 11.7–15.4)
GLOBULIN SER CALC-MCNC: 2.6 G/DL (ref 1.5–4.5)
GLUCOSE SERPL-MCNC: 92 MG/DL (ref 65–99)
HCT VFR BLD AUTO: 40.8 % (ref 34–46.6)
HDLC SERPL-MCNC: 69 MG/DL
HGB BLD-MCNC: 13.5 G/DL (ref 11.1–15.9)
IMM GRANULOCYTES # BLD AUTO: 0 X10E3/UL (ref 0–0.1)
IMM GRANULOCYTES NFR BLD AUTO: 0 %
IMMATURE CELLS  115398: NORMAL
LABORATORY COMMENT REPORT: ABNORMAL
LDLC SERPL CALC-MCNC: 100 MG/DL (ref 0–99)
LYMPHOCYTES # BLD AUTO: 2.6 X10E3/UL (ref 0.7–3.1)
LYMPHOCYTES NFR BLD AUTO: 33 %
MCH RBC QN AUTO: 29.2 PG (ref 26.6–33)
MCHC RBC AUTO-ENTMCNC: 33.1 G/DL (ref 31.5–35.7)
MCV RBC AUTO: 88 FL (ref 79–97)
MONOCYTES # BLD AUTO: 0.6 X10E3/UL (ref 0.1–0.9)
MONOCYTES NFR BLD AUTO: 8 %
MORPHOLOGY BLD-IMP: NORMAL
NEUTROPHILS # BLD AUTO: 4.6 X10E3/UL (ref 1.4–7)
NEUTROPHILS NFR BLD AUTO: 56 %
NRBC BLD AUTO-RTO: NORMAL %
PLATELET # BLD AUTO: 333 X10E3/UL (ref 150–450)
POTASSIUM SERPL-SCNC: 4.6 MMOL/L (ref 3.5–5.2)
PROT SERPL-MCNC: 6.5 G/DL (ref 6–8.5)
RBC # BLD AUTO: 4.63 X10E6/UL (ref 3.77–5.28)
SODIUM SERPL-SCNC: 139 MMOL/L (ref 134–144)
TRIGL SERPL-MCNC: 120 MG/DL (ref 0–149)
TSH SERPL DL<=0.005 MIU/L-ACNC: 3.45 UIU/ML (ref 0.45–4.5)
VLDLC SERPL CALC-MCNC: 21 MG/DL (ref 5–40)
WBC # BLD AUTO: 8 X10E3/UL (ref 3.4–10.8)

## 2022-08-08 ENCOUNTER — APPOINTMENT (OUTPATIENT)
Dept: MEDICAL GROUP | Age: 27
End: 2022-08-08
Payer: COMMERCIAL

## 2022-08-09 ENCOUNTER — TELEPHONE (OUTPATIENT)
Dept: HEALTH INFORMATION MANAGEMENT | Facility: OTHER | Age: 27
End: 2022-08-09
Payer: COMMERCIAL

## 2022-08-09 DIAGNOSIS — N62 LARGE BREASTS: ICD-10-CM

## 2022-08-09 DIAGNOSIS — G89.29 CHRONIC BILATERAL THORACIC BACK PAIN: ICD-10-CM

## 2022-08-09 DIAGNOSIS — M54.6 CHRONIC BILATERAL THORACIC BACK PAIN: ICD-10-CM

## 2022-08-09 NOTE — TELEPHONE ENCOUNTER
1. Caller Name: Danyelle Arevalo                            Call Back Number: [phone]        How would the patient prefer to be contacted with a response: "[employer]hart message    2. SPECIFIC Action To Be Taken: Referral pending, please sign.    3. Diagnosis/Clinical Reason for Request: Breast reduction due to chronic thoracic back pain    4. Specialty & Provider Name/Lab/Imaging Location: [facility]     5. Is appointment scheduled for requested order/referral: no    Patient was informed they will receive a return phone call from the office ONLY if there are any questions before processing their request. Advised to call back if they haven't received a call from the referral department in 5 days.

## 2022-08-12 ENCOUNTER — TELEPHONE (OUTPATIENT)
Dept: MEDICAL GROUP | Age: 27
End: 2022-08-12
Payer: COMMERCIAL

## 2022-08-12 DIAGNOSIS — M54.6 CHRONIC BILATERAL THORACIC BACK PAIN: ICD-10-CM

## 2022-08-12 DIAGNOSIS — G89.29 CHRONIC BILATERAL THORACIC BACK PAIN: ICD-10-CM

## 2022-08-12 NOTE — TELEPHONE ENCOUNTER
Message: Danyelle Arevalo would like to request a referral.  Reason: Need 6 weeks of chiropractor therapy  Requested provider: [facility]  Comment:  Hi,     I need 6 weeks of OT or chiropractor for insurance to cover breast reduction. I have an appointment Monday 8/15. Thank you!

## 2022-09-19 ENCOUNTER — OFFICE VISIT (OUTPATIENT)
Dept: MEDICAL GROUP | Age: 27
End: 2022-09-19
Payer: COMMERCIAL

## 2022-09-19 VITALS
SYSTOLIC BLOOD PRESSURE: 108 MMHG | OXYGEN SATURATION: 96 % | HEIGHT: 63 IN | HEART RATE: 89 BPM | DIASTOLIC BLOOD PRESSURE: 66 MMHG | TEMPERATURE: 97.4 F | WEIGHT: 248.8 LBS | BODY MASS INDEX: 44.08 KG/M2

## 2022-09-19 DIAGNOSIS — R10.11 RUQ DISCOMFORT: ICD-10-CM

## 2022-09-19 DIAGNOSIS — Z23 NEED FOR VACCINATION: ICD-10-CM

## 2022-09-19 DIAGNOSIS — F33.1 MODERATE EPISODE OF RECURRENT MAJOR DEPRESSIVE DISORDER (HCC): ICD-10-CM

## 2022-09-19 DIAGNOSIS — R63.2 BINGE EATING: ICD-10-CM

## 2022-09-19 PROCEDURE — 90686 IIV4 VACC NO PRSV 0.5 ML IM: CPT | Performed by: PHYSICIAN ASSISTANT

## 2022-09-19 PROCEDURE — 90471 IMMUNIZATION ADMIN: CPT | Performed by: PHYSICIAN ASSISTANT

## 2022-09-19 PROCEDURE — 99214 OFFICE O/P EST MOD 30 MIN: CPT | Mod: 25 | Performed by: PHYSICIAN ASSISTANT

## 2022-09-19 RX ORDER — FLUOXETINE HYDROCHLORIDE 20 MG/1
20 CAPSULE ORAL 3 TIMES DAILY
COMMUNITY
Start: 2022-07-11 | End: 2023-02-06

## 2022-09-19 RX ORDER — CYCLOBENZAPRINE HCL 5 MG
5-10 TABLET ORAL 3 TIMES DAILY PRN
COMMUNITY
End: 2023-02-06 | Stop reason: SDUPTHER

## 2022-09-19 ASSESSMENT — ANXIETY QUESTIONNAIRES
IF YOU CHECKED OFF ANY PROBLEMS ON THIS QUESTIONNAIRE, HOW DIFFICULT HAVE THESE PROBLEMS MADE IT FOR YOU TO DO YOUR WORK, TAKE CARE OF THINGS AT HOME, OR GET ALONG WITH OTHER PEOPLE: SOMEWHAT DIFFICULT
3. WORRYING TOO MUCH ABOUT DIFFERENT THINGS: SEVERAL DAYS
4. TROUBLE RELAXING: NOT AT ALL
2. NOT BEING ABLE TO STOP OR CONTROL WORRYING: SEVERAL DAYS
7. FEELING AFRAID AS IF SOMETHING AWFUL MIGHT HAPPEN: SEVERAL DAYS
1. FEELING NERVOUS, ANXIOUS, OR ON EDGE: NOT AT ALL
5. BEING SO RESTLESS THAT IT IS HARD TO SIT STILL: NOT AT ALL
6. BECOMING EASILY ANNOYED OR IRRITABLE: SEVERAL DAYS
GAD7 TOTAL SCORE: 4

## 2022-09-19 ASSESSMENT — PATIENT HEALTH QUESTIONNAIRE - PHQ9
SUM OF ALL RESPONSES TO PHQ QUESTIONS 1-9: 4
5. POOR APPETITE OR OVEREATING: 0 - NOT AT ALL
CLINICAL INTERPRETATION OF PHQ2 SCORE: 2

## 2022-09-19 ASSESSMENT — FIBROSIS 4 INDEX: FIB4 SCORE: 0.3

## 2022-09-19 NOTE — PROGRESS NOTES
Subjective:     Chief Complaint   Patient presents with    RUQ Pain     Right Upper Quadrant. Pain has subsided, but wanted to follow up about this.    Eating Disorder     She is currently recovering from an eating disorder.     Danyelle Arevalo is a 27 y.o. female here today for evaluation and management of:    Binge eating  Reports she is doing much better. Under care of [facility]-- eating disorder clinic for three months (partial hospitalization x 2 months; outpatient x 1 month). Sees counselor and nutritionist once a week.  No longer binging and very happy about it.   Has changed relationship with food.   Reports not necessarily making best choices. She is still having a hard time with vegetables.   She is going on walks x 60 minutes.     MDD  Self harm ideation a couple of times since being switched to Prozac from Wellbutrin by psychiatrist during partial hospitalization. Working on getting back in with them. Therapist aware.   Concerned she has ADD.  Reports sleep has been labile. Unclear why.    Has been working for [employer] x 1 month as a [employer] type employee.     RUQ discomfort  Reports that she was having discomfort in RUQ, intermittent (every couple of days) for the last 3-4 months. Dull, achey pain that was colicky in nature.   Reports has since resolved  Concerned it could be her gall bladder so tracked foods-- a lot of cheese.       Current medicines (including changes today)  Current Outpatient Medications   Medication Sig Dispense Refill    FLUoxetine (PROZAC) 20 MG Cap Take 20 mg by mouth in the morning, at noon, and at bedtime.      cyclobenzaprine (FLEXERIL) 5 mg tablet Take 5-10 mg by mouth 3 times a day as needed.      JUNEL FE 1.5/30 1.5-30 MG-MCG tablet TAKE 1 TABLET DAILY 84 Tablet 3    butalbital/apap/caffeine -40 mg (FIORICET) -40 MG Tab Take 1 tablet by mouth every four hours as needed for Headache.      ondansetron (ZOFRAN) 4 MG Tab tablet Take 1 Tab by mouth  "every four hours as needed for Nausea/Vomiting. 10 Tab 0     No current facility-administered medications for this visit.        Objective:     Vitals:    09/19/22 1011   BP: 108/66   BP Location: Right arm   Patient Position: Sitting   BP Cuff Size: Adult long   Pulse: 89   Temp: 36.3 °C (97.4 °F)   TempSrc: Temporal   SpO2: 96%   Weight: 113 kg (248 lb 12.8 oz)   Height: 1.6 m (5' 3\")     Body mass index is 44.07 kg/m².     Physical Exam:  Constitutional: Alert, no distress, well-groomed. Morbidly obese female.   Skin: Warm, dry, good turgor, no rashes in visible areas.  Eye: Equal, round and reactive, conjunctiva clear, lids normal.  Neck: Trachea midline, no masses  Cardiovascular: Regular rate and rhythm.  Chest: Effort normal. Clear to auscultation throughout. No adventitious sounds.   Abdomen: Soft, no gross masses.  MSK: Normal gait, moves all extremities.  Neuro: Grossly non-focal. No cranial nerve deficit. Strength and sensation intact.   Psych: Alert and oriented x3, normal affect and mood.      Assessment and Plan:   The following treatment plan was discussed:     1. Moderate episode of recurrent major depressive disorder (HCC)  Chronic, seems to be exacerbated since medication change by psychiatrist. Offered to change medication today, however, she will try to get in with psychiatrist. Stressed importance of continued therapy. She denies any plan just an increase in thoughts.   - FLUoxetine (PROZAC) 20 MG Cap; Take 20 mg by mouth in the morning, at noon, and at bedtime.    2. Binge eating  Chronic, stable. Reports partial hospitalization coupled with her therapy has helped her a lot. She is currently working on her relationship with food. Thinks she is doing really well.   - FLUoxetine (PROZAC) 20 MG Cap; Take 20 mg by mouth in the morning, at noon, and at bedtime.    3. RUQ discomfort  Chronic, appears to be stable at this time and potentially resolved. Discussed gall bladder disease and " pathophysiology of gallstones when eating higher fat diet. She will watch her diet. If returns, we will obtain US. ED precautions given for acute cholecystitis.    4. Need for vaccination  VIS given. Verbal informed consent obtained. Vaccine administered in office.   - INFLUENZA VACCINE QUAD INJ (PF)    Health Maintenance: Up-to-date     Followup: Return in about 6 months (around 3/19/2023) for annual exam, sooner if needed.

## 2022-09-24 ENCOUNTER — OFFICE VISIT (OUTPATIENT)
Dept: URGENT CARE | Facility: CLINIC | Age: 27
End: 2022-09-24
Payer: COMMERCIAL

## 2022-09-24 VITALS
DIASTOLIC BLOOD PRESSURE: 78 MMHG | HEART RATE: 86 BPM | SYSTOLIC BLOOD PRESSURE: 116 MMHG | TEMPERATURE: 96.9 F | OXYGEN SATURATION: 97 % | WEIGHT: 250.4 LBS | RESPIRATION RATE: 16 BRPM | BODY MASS INDEX: 44.37 KG/M2 | HEIGHT: 63 IN

## 2022-09-24 DIAGNOSIS — M22.2X2 PATELLOFEMORAL PAIN SYNDROME OF LEFT KNEE: ICD-10-CM

## 2022-09-24 PROCEDURE — 99213 OFFICE O/P EST LOW 20 MIN: CPT | Performed by: PHYSICIAN ASSISTANT

## 2022-09-24 ASSESSMENT — ENCOUNTER SYMPTOMS
CHILLS: 0
SENSORY CHANGE: 0
SORE THROAT: 0
VOMITING: 0
NAUSEA: 0
FEVER: 0
SHORTNESS OF BREATH: 0
BLURRED VISION: 0
PALPITATIONS: 0
TINGLING: 0

## 2022-09-24 ASSESSMENT — FIBROSIS 4 INDEX: FIB4 SCORE: 0.3

## 2022-09-24 NOTE — PROGRESS NOTES
"Subjective     Danyelle Arevalo is a 27 y.o. female who presents with Knee Pain (Left, X2 days ago, Started Wednesday, \" Not sure if its hurting from over use, or if I actually injured it.\")      HPI:  Danyelle Arevalo is a 27 y.o. female who presents today for evaluation of left knee pain.  Patient reports that she was in [facility] this past week and was walking more than she normally does.  Her knee started to hurt a little bit on Wednesday but 2 days ago got a bit worse.  Seems to be worse when she is walking and especially when she is walking up and down stairs.  She denies any fall or twisting injury.  No previous injury to her left knee.  No distal numbness/tingling.  No focal weakness.      Review of Systems   Constitutional:  Negative for chills and fever.   HENT:  Negative for sore throat.    Eyes:  Negative for blurred vision.   Respiratory:  Negative for shortness of breath.    Cardiovascular:  Negative for chest pain and palpitations.   Gastrointestinal:  Negative for nausea and vomiting.   Musculoskeletal:  Positive for joint pain (left knee).   Neurological:  Negative for tingling and sensory change.         PMH:  has a past medical history of DUB (dysfunctional uterine bleeding) (2/1/2015), H/O iron deficiency secondary to DUB (2/1/2015), Major depression (12/12/2014), and Tension headache (12/12/2014).  MEDS:   Current Outpatient Medications:     FLUoxetine (PROZAC) 20 MG Cap, Take 20 mg by mouth in the morning, at noon, and at bedtime., Disp: , Rfl:     cyclobenzaprine (FLEXERIL) 5 mg tablet, Take 5-10 mg by mouth 3 times a day as needed., Disp: , Rfl:     JUNEL FE 1.5/30 1.5-30 MG-MCG tablet, TAKE 1 TABLET DAILY, Disp: 84 Tablet, Rfl: 3    butalbital/apap/caffeine -40 mg (FIORICET) -40 MG Tab, Take 1 tablet by mouth every four hours as needed for Headache., Disp: , Rfl:     ondansetron (ZOFRAN) 4 MG Tab tablet, Take 1 Tab by mouth every four hours as needed for " "Nausea/Vomiting., Disp: 10 Tab, Rfl: 0  ALLERGIES:   Allergies   Allergen Reactions    Latex      When apply will cause rash     SURGHX:   Past Surgical History:   Procedure Laterality Date    APPENDECTOMY  2007     SOCHX:  reports that she has never smoked. She has never used smokeless tobacco. She reports that she does not currently use alcohol. She reports that she does not use drugs.  FH: Family history was reviewed, no pertinent findings to report      Objective     /78 (BP Location: Right arm, Patient Position: Sitting, BP Cuff Size: Adult)   Pulse 86   Temp 36.1 °C (96.9 °F) (Temporal)   Resp 16   Ht 1.6 m (5' 3\")   Wt 114 kg (250 lb 6.4 oz)   SpO2 97%   BMI 44.36 kg/m²      Physical Exam  Constitutional:       Appearance: She is well-developed.   HENT:      Head: Normocephalic and atraumatic.      Right Ear: External ear normal.      Left Ear: External ear normal.   Eyes:      Conjunctiva/sclera: Conjunctivae normal.      Pupils: Pupils are equal, round, and reactive to light.   Cardiovascular:      Rate and Rhythm: Normal rate and regular rhythm.      Heart sounds: Normal heart sounds. No murmur heard.  Pulmonary:      Effort: Pulmonary effort is normal.      Breath sounds: Normal breath sounds. No wheezing.   Musculoskeletal:      Left knee: Swelling and bony tenderness present. No effusion, erythema, ecchymosis or crepitus. Decreased range of motion (Mildly decreased range of motion with full extension secondary to pain). Tenderness present over the medial joint line and patellar tendon. No lateral joint line, MCL, LCL, ACL or PCL tenderness. No LCL laxity or MCL laxity.Normal alignment.      Instability Tests: Anterior drawer test negative. Medial Kenoyn test negative and lateral Kenyon test negative.   Skin:     General: Skin is warm and dry.      Capillary Refill: Capillary refill takes less than 2 seconds.   Neurological:      Mental Status: She is alert and oriented to person, place, " and time.   Psychiatric:         Behavior: Behavior normal.         Judgment: Judgment normal.         Assessment & Plan     1. Patellofemoral pain syndrome of left knee  - Referral to Sports Medicine  Symptoms and physical exam findings seem most consistent with patellofemoral pain syndrome of the left knee.  This is likely due to overuse while she was at [facility] this past week.  Recommend supportive care for now to include the use of OTC NSAIDs, rest, ice, elevation.  She has a compression sleeve at home that she will use as well.  Referral to sports medicine placed for more definitive management and evaluation if symptoms persist.            Differential Diagnosis, natural history, and supportive care discussed. Return to the Urgent Care or follow up with your PCP if symptoms fail to resolve, or for any new or worsening symptoms. Emergency room precautions discussed. Patient and/or family appears understanding of information.

## 2023-02-04 SDOH — ECONOMIC STABILITY: INCOME INSECURITY: IN THE LAST 12 MONTHS, WAS THERE A TIME WHEN YOU WERE NOT ABLE TO PAY THE MORTGAGE OR RENT ON TIME?: NO

## 2023-02-04 SDOH — ECONOMIC STABILITY: HOUSING INSECURITY: IN THE LAST 12 MONTHS, HOW MANY PLACES HAVE YOU LIVED?: 1

## 2023-02-04 SDOH — HEALTH STABILITY: MENTAL HEALTH
STRESS IS WHEN SOMEONE FEELS TENSE, NERVOUS, ANXIOUS, OR CAN'T SLEEP AT NIGHT BECAUSE THEIR MIND IS TROUBLED. HOW STRESSED ARE YOU?: ONLY A LITTLE

## 2023-02-04 SDOH — ECONOMIC STABILITY: HOUSING INSECURITY
IN THE LAST 12 MONTHS, WAS THERE A TIME WHEN YOU DID NOT HAVE A STEADY PLACE TO SLEEP OR SLEPT IN A SHELTER (INCLUDING NOW)?: NO

## 2023-02-04 SDOH — ECONOMIC STABILITY: TRANSPORTATION INSECURITY
IN THE PAST 12 MONTHS, HAS THE LACK OF TRANSPORTATION KEPT YOU FROM MEDICAL APPOINTMENTS OR FROM GETTING MEDICATIONS?: NO

## 2023-02-04 SDOH — ECONOMIC STABILITY: TRANSPORTATION INSECURITY
IN THE PAST 12 MONTHS, HAS LACK OF RELIABLE TRANSPORTATION KEPT YOU FROM MEDICAL APPOINTMENTS, MEETINGS, WORK OR FROM GETTING THINGS NEEDED FOR DAILY LIVING?: NO

## 2023-02-04 SDOH — HEALTH STABILITY: PHYSICAL HEALTH: ON AVERAGE, HOW MANY DAYS PER WEEK DO YOU ENGAGE IN MODERATE TO STRENUOUS EXERCISE (LIKE A BRISK WALK)?: 3 DAYS

## 2023-02-04 SDOH — HEALTH STABILITY: PHYSICAL HEALTH: ON AVERAGE, HOW MANY MINUTES DO YOU ENGAGE IN EXERCISE AT THIS LEVEL?: 10 MIN

## 2023-02-04 SDOH — ECONOMIC STABILITY: TRANSPORTATION INSECURITY
IN THE PAST 12 MONTHS, HAS LACK OF TRANSPORTATION KEPT YOU FROM MEETINGS, WORK, OR FROM GETTING THINGS NEEDED FOR DAILY LIVING?: NO

## 2023-02-04 SDOH — ECONOMIC STABILITY: FOOD INSECURITY: WITHIN THE PAST 12 MONTHS, THE FOOD YOU BOUGHT JUST DIDN'T LAST AND YOU DIDN'T HAVE MONEY TO GET MORE.: NEVER TRUE

## 2023-02-04 SDOH — ECONOMIC STABILITY: FOOD INSECURITY: WITHIN THE PAST 12 MONTHS, YOU WORRIED THAT YOUR FOOD WOULD RUN OUT BEFORE YOU GOT MONEY TO BUY MORE.: NEVER TRUE

## 2023-02-04 SDOH — ECONOMIC STABILITY: INCOME INSECURITY: HOW HARD IS IT FOR YOU TO PAY FOR THE VERY BASICS LIKE FOOD, HOUSING, MEDICAL CARE, AND HEATING?: SOMEWHAT HARD

## 2023-02-04 ASSESSMENT — SOCIAL DETERMINANTS OF HEALTH (SDOH)
HOW OFTEN DO YOU GET TOGETHER WITH FRIENDS OR RELATIVES?: ONCE A WEEK
ARE YOU MARRIED, WIDOWED, DIVORCED, SEPARATED, NEVER MARRIED, OR LIVING WITH A PARTNER?: NEVER MARRIED
HOW OFTEN DO YOU HAVE SIX OR MORE DRINKS ON ONE OCCASION: LESS THAN MONTHLY
IN A TYPICAL WEEK, HOW MANY TIMES DO YOU TALK ON THE PHONE WITH FAMILY, FRIENDS, OR NEIGHBORS?: TWICE A WEEK
HOW OFTEN DO YOU ATTENT MEETINGS OF THE CLUB OR ORGANIZATION YOU BELONG TO?: PATIENT DECLINED
HOW OFTEN DO YOU HAVE A DRINK CONTAINING ALCOHOL: MONTHLY OR LESS
WITHIN THE PAST 12 MONTHS, YOU WORRIED THAT YOUR FOOD WOULD RUN OUT BEFORE YOU GOT THE MONEY TO BUY MORE: NEVER TRUE
HOW OFTEN DO YOU ATTENT MEETINGS OF THE CLUB OR ORGANIZATION YOU BELONG TO?: PATIENT DECLINED
ARE YOU MARRIED, WIDOWED, DIVORCED, SEPARATED, NEVER MARRIED, OR LIVING WITH A PARTNER?: NEVER MARRIED
DO YOU BELONG TO ANY CLUBS OR ORGANIZATIONS SUCH AS CHURCH GROUPS UNIONS, FRATERNAL OR ATHLETIC GROUPS, OR SCHOOL GROUPS?: NO
HOW OFTEN DO YOU ATTEND CHURCH OR RELIGIOUS SERVICES?: NEVER
IN A TYPICAL WEEK, HOW MANY TIMES DO YOU TALK ON THE PHONE WITH FAMILY, FRIENDS, OR NEIGHBORS?: TWICE A WEEK
HOW MANY DRINKS CONTAINING ALCOHOL DO YOU HAVE ON A TYPICAL DAY WHEN YOU ARE DRINKING: 5 OR 6
DO YOU BELONG TO ANY CLUBS OR ORGANIZATIONS SUCH AS CHURCH GROUPS UNIONS, FRATERNAL OR ATHLETIC GROUPS, OR SCHOOL GROUPS?: NO
HOW HARD IS IT FOR YOU TO PAY FOR THE VERY BASICS LIKE FOOD, HOUSING, MEDICAL CARE, AND HEATING?: SOMEWHAT HARD
HOW OFTEN DO YOU GET TOGETHER WITH FRIENDS OR RELATIVES?: ONCE A WEEK
HOW OFTEN DO YOU ATTEND CHURCH OR RELIGIOUS SERVICES?: NEVER

## 2023-02-04 ASSESSMENT — LIFESTYLE VARIABLES
AUDIT-C TOTAL SCORE: 4
HOW MANY STANDARD DRINKS CONTAINING ALCOHOL DO YOU HAVE ON A TYPICAL DAY: 5 OR 6
HOW OFTEN DO YOU HAVE SIX OR MORE DRINKS ON ONE OCCASION: LESS THAN MONTHLY
HOW OFTEN DO YOU HAVE A DRINK CONTAINING ALCOHOL: MONTHLY OR LESS
SKIP TO QUESTIONS 9-10: 0

## 2023-02-06 ENCOUNTER — OFFICE VISIT (OUTPATIENT)
Dept: MEDICAL GROUP | Age: 28
End: 2023-02-06
Payer: COMMERCIAL

## 2023-02-06 VITALS
HEART RATE: 76 BPM | SYSTOLIC BLOOD PRESSURE: 110 MMHG | HEIGHT: 63 IN | TEMPERATURE: 98 F | RESPIRATION RATE: 16 BRPM | DIASTOLIC BLOOD PRESSURE: 70 MMHG | OXYGEN SATURATION: 98 % | WEIGHT: 250 LBS | BODY MASS INDEX: 44.3 KG/M2

## 2023-02-06 DIAGNOSIS — E66.01 MORBID OBESITY WITH BMI OF 40.0-44.9, ADULT (HCC): ICD-10-CM

## 2023-02-06 DIAGNOSIS — E78.00 HYPERCHOLESTEROLEMIA: ICD-10-CM

## 2023-02-06 DIAGNOSIS — Z00.00 WELL ADULT EXAM: ICD-10-CM

## 2023-02-06 DIAGNOSIS — Z72.51 UNPROTECTED SEXUAL INTERCOURSE: ICD-10-CM

## 2023-02-06 LAB
POCT INT CON NEG: NEGATIVE
POCT INT CON POS: POSITIVE
POCT URINE PREGNANCY TEST: NEGATIVE

## 2023-02-06 PROCEDURE — 81025 URINE PREGNANCY TEST: CPT | Performed by: PHYSICIAN ASSISTANT

## 2023-02-06 PROCEDURE — 99395 PREV VISIT EST AGE 18-39: CPT | Performed by: PHYSICIAN ASSISTANT

## 2023-02-06 ASSESSMENT — PATIENT HEALTH QUESTIONNAIRE - PHQ9: CLINICAL INTERPRETATION OF PHQ2 SCORE: 0

## 2023-02-06 ASSESSMENT — FIBROSIS 4 INDEX: FIB4 SCORE: 0.3

## 2023-02-06 NOTE — PROGRESS NOTES
Subjective:     CC:   Chief Complaint   Patient presents with    Annual Exam       HPI:   Danyelle Arevalo is a 27 y.o. female who presents for annual exam  Patient has GYN provider: No   Last Pap Smear:    H/O Abnormal Pap: No  Last Mammogram: to start at age 37  Last Colorectal Cancer Screening: N/A  Last Tdap:   Received HPV series: Yes    Morbid obesity with BMI of 40.0-44.9, adult ([facility])  Reports under care of [place] weight loss-- drops under tongue for the last week; has already lost 10 lbs. Not exercising because not allowed to with program (concerns with increasing heart rate). She is eliminating foods per their protocol.   9 week program.       Now a PAR for [facility]. Happy.     Unprotected intercourse   10 days ago. First time having intercourse. [place] through decided to use condom. Would like pregnancy test. Due for menses last Thursday--still hasn't come  Not on OCP because found that it was causing her depression to worsen, was not sexually active and her menses normalized.       No LMP recorded.  Hx STDs: No  Birth control: none  Menses every month with 3-5 days with moderate bleeding.  Denies cramping.  No significant bloating/fluid retention, pelvic pain, or dyspareunia. No abnormal vaginal discharge.  No breast tenderness, mass, nipple discharge, changes in size or contour, or abnormal cyclic discomfort.    OB History    Para Term  AB Living   0 0 0 0 0 0   SAB IAB Ectopic Molar Multiple Live Births   0 0 0 0 0 0      She  reports never being sexually active.    She  has a past medical history of DUB (dysfunctional uterine bleeding) (2015), H/O iron deficiency secondary to DUB (2015), Major depression (2014), and Tension headache (2014).  She  has a past surgical history that includes appendectomy (2007) and mammoplasty reduction (Bilateral, 2022).    Family History   Problem Relation Age of Onset    Hypertension Maternal Grandmother      Cancer Maternal Grandfather 62        colon    Diabetes Maternal Grandfather     Hyperlipidemia Father     Heart Attack Father 51    Hyperlipidemia Maternal Uncle     Hyperlipidemia Paternal Aunt     Hyperlipidemia Paternal Aunt     Hyperlipidemia Maternal Uncle     Cancer Mother 47        Breast    Cancer Paternal Grandmother         breast     Social History     Tobacco Use    Smoking status: Never    Smokeless tobacco: Never   Vaping Use    Vaping Use: Never used   Substance Use Topics    Alcohol use: Not Currently     Comment: 1 drink a couple times a year    Drug use: No       Patient Active Problem List    Diagnosis Date Noted    Family history of breast cancer 04/18/2022    Major depressive disorder 07/27/2020    Hypercholesterolemia 01/30/2020    Weight gain 01/30/2020    Chronic fatigue 01/30/2020    Binge eating 01/30/2020    Gastroesophageal reflux disease without esophagitis 01/30/2020    Morbid obesity with BMI of 40.0-44.9, adult (HCC) 11/28/2016    DUB (dysfunctional uterine bleeding) 02/01/2015    H/O iron deficiency secondary to DUB 02/01/2015    Tension headache 12/12/2014     Current Outpatient Medications   Medication Sig Dispense Refill    cyclobenzaprine (FLEXERIL) 5 mg tablet Take 5-10 mg by mouth 3 times a day as needed.      butalbital/apap/caffeine -40 mg (FIORICET) -40 MG Tab Take 1 tablet by mouth every four hours as needed for Headache.      ondansetron (ZOFRAN) 4 MG Tab tablet Take 1 Tab by mouth every four hours as needed for Nausea/Vomiting. 10 Tab 0     No current facility-administered medications for this visit.     No Active Allergies    Review of Systems   Constitutional: Negative for fever, chills and malaise/fatigue.   HENT: current nasal congestion--recent URI  Eyes: Negative for pain.   Respiratory: Negative for shortness of breath.  Current cough--recent URI   Cardiovascular: Negative for chest pain and leg swelling.   Gastrointestinal: Negative for nausea,  "vomiting, abdominal pain and diarrhea.   Genitourinary: Negative for dysuria and hematuria.   Skin: Negative for rash.   Neurological: Negative for focal weakness and headaches. Some dizziness for last 6 weeks--intermittent. Occurred after abruptly stopping prozac TID dosing.   Endo/Heme/Allergies: Does not bruise/bleed easily.   Psychiatric/Behavioral: stable    Objective:   /70 (BP Location: Right arm, Patient Position: Sitting, BP Cuff Size: Large adult)   Pulse 76   Temp 36.7 °C (98 °F) (Temporal)   Resp 16   Ht 1.6 m (5' 3\")   Wt 113 kg (250 lb)   SpO2 98%   BMI 44.29 kg/m²     Wt Readings from Last 4 Encounters:   02/06/23 113 kg (250 lb)   09/24/22 114 kg (250 lb 6.4 oz)   09/19/22 113 kg (248 lb 12.8 oz)   04/18/22 111 kg (244 lb)     Physical Exam:  Constitutional: Well-developed and well-nourished. Not diaphoretic. No distress.   Skin: Skin is warm and dry. No rash noted.  Head: Atraumatic without lesions.  Eyes: Conjunctivae and extraocular motions are normal. Pupils are equal, round, and reactive to light. No scleral icterus.   Ears:  External ears unremarkable.   Mouth/Throat: Tongue normal. Oropharynx is clear and moist. Posterior pharynx without erythema or exudates.  Neck: Supple, trachea midline. Normal range of motion. No thyromegaly present. No lymphadenopathy--cervical or supraclavicular.  Cardiovascular: Regular rate and rhythm, S1 and S2 without murmur, rubs, or gallops.    Respiratory: Effort normal. Clear to auscultation throughout. No adventitious sounds.   Abdomen: Soft, non tender, and without distention. Active bowel sounds in all four quadrants. No rebound, guarding, masses or HSM.  Extremities: No cyanosis, clubbing, erythema, nor edema. Distal pulses intact and symmetric.   Neurological: Alert and oriented x 3. Grossly non-focal. Strength and sensation grossly intact. DTRs 2+/3 and symmetric.   Psychiatric:  Behavior, mood, and affect are appropriate    Results for orders " placed or performed in visit on 02/06/23   POCT Pregnancy   Result Value Ref Range    POC Urine Pregnancy Test Negative     Internal Control Positive Positive     Internal Control Negative Negative       Reviewed and discussed above labs with patient in visit.    Assessment and Plan:     1. Well adult exam  Health maintenance:  UTD   Labs per orders  Patient counseled about skin care, diet, supplements, and exercise.  Discussed  mammography screening--starting at age 35, STD prevention, HIV risk factors and prevention, family planning choices--consider IUD or Nexplanon     2. Morbid obesity with BMI of 40.0-44.9, adult (HCC)  Chronic, improving. Encouraged healthy eating and exercise.  - Patient identified as having weight management issue.  Appropriate orders and counseling given.    3. Unprotected sexual intercourse  Negative HCG in office--to repeat in 2 weeks at home. She did take Plan B so unlikely. No swabs available in clinic to assess GC chlamydia-- will order urine, no urination for at least 2 hours prior to lab collection. Blood work as well to r/o STDs. Discussed importance of safe sex practices.  - POCT Pregnancy  - T.PALLIDUM AB NATHALY (SCREENING); Future  - HEP C VIRUS ANTIBODY; Future  - HIV AG/AB COMBO ASSAY SCREENING; Future  - Chlamydia/GC, PCR (Urine); Future    4. Hypercholesterolemia  Chronic, stable. Continue lifestyle modifications. Monitor labs regularly.   - Comp Metabolic Panel; Future  - CBC WITH DIFFERENTIAL; Future  - Lipid Profile; Future  - TSH WITH REFLEX TO FT4; Future    Follow-up: Return in about 1 year (around 2/6/2024) for annual exam, sooner if needed.

## 2023-02-18 LAB
ALBUMIN SERPL-MCNC: 4.7 G/DL (ref 3.9–5)
ALBUMIN/GLOB SERPL: 1.9 {RATIO} (ref 1.2–2.2)
ALP SERPL-CCNC: 104 IU/L (ref 44–121)
ALT SERPL-CCNC: 24 IU/L (ref 0–32)
AST SERPL-CCNC: 25 IU/L (ref 0–40)
BASOPHILS # BLD AUTO: 0 X10E3/UL (ref 0–0.2)
BASOPHILS NFR BLD AUTO: 1 %
BILIRUB SERPL-MCNC: 0.8 MG/DL (ref 0–1.2)
BUN SERPL-MCNC: 8 MG/DL (ref 6–20)
BUN/CREAT SERPL: 11 (ref 9–23)
C TRACH RRNA SPEC QL NAA+PROBE: NEGATIVE
CALCIUM SERPL-MCNC: 9.4 MG/DL (ref 8.7–10.2)
CHLORIDE SERPL-SCNC: 103 MMOL/L (ref 96–106)
CHOLEST SERPL-MCNC: 184 MG/DL (ref 100–199)
CO2 SERPL-SCNC: 22 MMOL/L (ref 20–29)
CREAT SERPL-MCNC: 0.76 MG/DL (ref 0.57–1)
EGFRCR SERPLBLD CKD-EPI 2021: 111 ML/MIN/1.73
EOSINOPHIL # BLD AUTO: 0.1 X10E3/UL (ref 0–0.4)
EOSINOPHIL NFR BLD AUTO: 1 %
ERYTHROCYTE [DISTWIDTH] IN BLOOD BY AUTOMATED COUNT: 13.1 % (ref 11.7–15.4)
GLOBULIN SER CALC-MCNC: 2.5 G/DL (ref 1.5–4.5)
GLUCOSE SERPL-MCNC: 82 MG/DL (ref 70–99)
HCT VFR BLD AUTO: 42.8 % (ref 34–46.6)
HCV IGG SERPL QL IA: NON REACTIVE
HDLC SERPL-MCNC: 41 MG/DL
HGB BLD-MCNC: 14.3 G/DL (ref 11.1–15.9)
HIV 1+2 AB+HIV1 P24 AG SERPL QL IA: NON REACTIVE
IMM GRANULOCYTES # BLD AUTO: 0 X10E3/UL (ref 0–0.1)
IMM GRANULOCYTES NFR BLD AUTO: 0 %
IMMATURE CELLS  115398: NORMAL
LABORATORY COMMENT REPORT: ABNORMAL
LDLC SERPL CALC-MCNC: 128 MG/DL (ref 0–99)
LYMPHOCYTES # BLD AUTO: 1.4 X10E3/UL (ref 0.7–3.1)
LYMPHOCYTES NFR BLD AUTO: 20 %
MCH RBC QN AUTO: 28.3 PG (ref 26.6–33)
MCHC RBC AUTO-ENTMCNC: 33.4 G/DL (ref 31.5–35.7)
MCV RBC AUTO: 85 FL (ref 79–97)
MONOCYTES # BLD AUTO: 0.5 X10E3/UL (ref 0.1–0.9)
MONOCYTES NFR BLD AUTO: 7 %
MORPHOLOGY BLD-IMP: NORMAL
N GONORRHOEA RRNA SPEC QL NAA+PROBE: NEGATIVE
NEUTROPHILS # BLD AUTO: 5.3 X10E3/UL (ref 1.4–7)
NEUTROPHILS NFR BLD AUTO: 71 %
NRBC BLD AUTO-RTO: NORMAL %
PLATELET # BLD AUTO: 342 X10E3/UL (ref 150–450)
POTASSIUM SERPL-SCNC: 4.2 MMOL/L (ref 3.5–5.2)
PROT SERPL-MCNC: 7.2 G/DL (ref 6–8.5)
RBC # BLD AUTO: 5.05 X10E6/UL (ref 3.77–5.28)
SODIUM SERPL-SCNC: 141 MMOL/L (ref 134–144)
TREPONEMA PALLIDUM IGG+IGM AB [PRESENCE] IN SERUM OR PLASMA BY IMMUNOASSAY: NON REACTIVE
TRIGL SERPL-MCNC: 83 MG/DL (ref 0–149)
TSH SERPL DL<=0.005 MIU/L-ACNC: 1.25 UIU/ML (ref 0.45–4.5)
VLDLC SERPL CALC-MCNC: 15 MG/DL (ref 5–40)
WBC # BLD AUTO: 7.4 X10E3/UL (ref 3.4–10.8)

## 2023-02-27 ENCOUNTER — TELEPHONE (OUTPATIENT)
Dept: MEDICAL GROUP | Age: 28
End: 2023-02-27

## 2023-02-28 NOTE — TELEPHONE ENCOUNTER
Patient desirous of Kyleena IUD insertion. Please order through specialty pharmacy for this patient under Dr. Pulido. Patient informed she will be called by specialty pharmacy to confirm. Once we receive IUD, will need to schedule insertion with Dr. Pulido.